# Patient Record
Sex: MALE | Race: WHITE | Employment: OTHER | URBAN - NONMETROPOLITAN AREA
[De-identification: names, ages, dates, MRNs, and addresses within clinical notes are randomized per-mention and may not be internally consistent; named-entity substitution may affect disease eponyms.]

---

## 2017-01-01 ENCOUNTER — APPOINTMENT (OUTPATIENT)
Dept: GENERAL RADIOLOGY | Age: 82
DRG: 570 | End: 2017-01-01
Attending: INTERNAL MEDICINE
Payer: MEDICARE

## 2017-01-01 ENCOUNTER — APPOINTMENT (OUTPATIENT)
Dept: INTERVENTIONAL RADIOLOGY/VASCULAR | Age: 82
DRG: 602 | End: 2017-01-01
Attending: FAMILY MEDICINE
Payer: MEDICARE

## 2017-01-01 ENCOUNTER — HOSPITAL ENCOUNTER (OUTPATIENT)
Age: 82
Setting detail: OUTPATIENT SURGERY
Discharge: HOME | End: 2017-12-11
Attending: SPECIALIST | Admitting: SPECIALIST
Payer: MEDICARE

## 2017-01-01 ENCOUNTER — APPOINTMENT (OUTPATIENT)
Dept: ULTRASOUND IMAGING | Age: 82
DRG: 570 | End: 2017-01-01
Attending: INTERNAL MEDICINE
Payer: MEDICARE

## 2017-01-01 ENCOUNTER — HOSPITAL ENCOUNTER (INPATIENT)
Age: 82
LOS: 3 days | Discharge: NURSING, REHAB OR LONG-TERM CARE | DRG: 570 | End: 2017-12-22
Attending: INTERNAL MEDICINE | Admitting: INTERNAL MEDICINE
Payer: MEDICARE

## 2017-01-01 ENCOUNTER — HOSPITAL ENCOUNTER (OUTPATIENT)
Age: 82
Discharge: HOME | End: 2017-09-25
Payer: MEDICARE

## 2017-01-01 ENCOUNTER — HOSPITAL ENCOUNTER (INPATIENT)
Age: 82
LOS: 14 days | Discharge: HOME HEALTH | DRG: 602 | End: 2018-01-05
Attending: FAMILY MEDICINE | Admitting: FAMILY MEDICINE
Payer: MEDICARE

## 2017-01-01 ENCOUNTER — HOSPITAL ENCOUNTER (OUTPATIENT)
Age: 82
Setting detail: OUTPATIENT SURGERY
Discharge: HOME | End: 2017-08-16
Attending: SPECIALIST | Admitting: SPECIALIST
Payer: MEDICARE

## 2017-01-01 ENCOUNTER — APPOINTMENT (OUTPATIENT)
Dept: CT IMAGING | Age: 82
DRG: 570 | End: 2017-01-01
Attending: INTERNAL MEDICINE
Payer: MEDICARE

## 2017-01-01 VITALS
HEIGHT: 68 IN | HEART RATE: 76 BPM | TEMPERATURE: 97.7 F | DIASTOLIC BLOOD PRESSURE: 73 MMHG | WEIGHT: 148.2 LBS | OXYGEN SATURATION: 97 % | RESPIRATION RATE: 16 BRPM | SYSTOLIC BLOOD PRESSURE: 138 MMHG | BODY MASS INDEX: 22.46 KG/M2

## 2017-01-01 VITALS
BODY MASS INDEX: 23.39 KG/M2 | SYSTOLIC BLOOD PRESSURE: 110 MMHG | DIASTOLIC BLOOD PRESSURE: 60 MMHG | OXYGEN SATURATION: 93 % | TEMPERATURE: 97.6 F | RESPIRATION RATE: 17 BRPM | HEIGHT: 68 IN | WEIGHT: 154.3 LBS | HEART RATE: 71 BPM

## 2017-01-01 VITALS
TEMPERATURE: 98.7 F | WEIGHT: 147 LBS | HEIGHT: 68 IN | DIASTOLIC BLOOD PRESSURE: 79 MMHG | BODY MASS INDEX: 22.28 KG/M2 | HEART RATE: 64 BPM | SYSTOLIC BLOOD PRESSURE: 158 MMHG | RESPIRATION RATE: 16 BRPM | OXYGEN SATURATION: 95 %

## 2017-01-01 DIAGNOSIS — L03.312 CELLULITIS OF BACK EXCEPT BUTTOCK: Primary | ICD-10-CM

## 2017-01-01 DIAGNOSIS — E43 SEVERE MALNUTRITION (HCC): ICD-10-CM

## 2017-01-01 LAB
ABO: NORMAL
AEROBIC CULTURE: ABNORMAL
ALBUMIN SERPL-MCNC: 2.7 G/DL (ref 3.5–5.1)
ALBUMIN SERPL-MCNC: 2.7 G/DL (ref 3.5–5.1)
ALBUMIN SERPL-MCNC: 2.9 G/DL (ref 3.5–5.1)
ALBUMIN SERPL-MCNC: 3.1 G/DL (ref 3.5–5.1)
ALBUMIN SERPL-MCNC: 3.6 G/DL (ref 3.5–5.1)
ALP BLD-CCNC: 55 U/L (ref 38–126)
ALP BLD-CCNC: 55 U/L (ref 38–126)
ALP BLD-CCNC: 63 U/L (ref 38–126)
ALP BLD-CCNC: 64 U/L (ref 38–126)
ALP BLD-CCNC: 65 U/L (ref 38–126)
ALT SERPL-CCNC: 13 U/L (ref 11–66)
ALT SERPL-CCNC: 17 U/L (ref 11–66)
ALT SERPL-CCNC: 9 U/L (ref 11–66)
ANAEROBIC CULTURE: ABNORMAL
ANAEROBIC CULTURE: ABNORMAL
ANION GAP SERPL CALCULATED.3IONS-SCNC: 14 MEQ/L (ref 8–16)
ANION GAP SERPL CALCULATED.3IONS-SCNC: 5 MEQ/L (ref 8–16)
ANION GAP SERPL CALCULATED.3IONS-SCNC: 6 MEQ/L (ref 8–16)
ANION GAP SERPL CALCULATED.3IONS-SCNC: 7 MEQ/L (ref 8–16)
ANION GAP SERPL CALCULATED.3IONS-SCNC: 8 MEQ/L (ref 8–16)
ANION GAP SERPL CALCULATED.3IONS-SCNC: 8 MEQ/L (ref 8–16)
ANION GAP SERPL CALCULATED.3IONS-SCNC: 9 MEQ/L (ref 8–16)
ANISOCYTOSIS: ABNORMAL
ANTIBODY SCREEN: NORMAL
AST SERPL-CCNC: 15 U/L (ref 5–40)
AST SERPL-CCNC: 15 U/L (ref 5–40)
AST SERPL-CCNC: 17 U/L (ref 5–40)
AST SERPL-CCNC: 18 U/L (ref 5–40)
AST SERPL-CCNC: 24 U/L (ref 5–40)
BACTERIA: ABNORMAL /HPF
BANDED NEUTROPHILS ABSOLUTE COUNT: 0.1 THOU/MM3
BANDED NEUTROPHILS ABSOLUTE COUNT: 0.1 THOU/MM3
BANDS PRESENT: 2 %
BANDS PRESENT: 3 %
BASOPHILS # BLD: 0 %
BASOPHILS # BLD: 0 %
BASOPHILS # BLD: 2.7 %
BASOPHILS # BLD: 3 %
BASOPHILS ABSOLUTE: 0 THOU/MM3 (ref 0–0.1)
BASOPHILS ABSOLUTE: 0 THOU/MM3 (ref 0–0.1)
BASOPHILS ABSOLUTE: 0.1 THOU/MM3 (ref 0–0.1)
BASOPHILS ABSOLUTE: 0.1 THOU/MM3 (ref 0–0.1)
BILIRUB SERPL-MCNC: 0.2 MG/DL (ref 0.3–1.2)
BILIRUB SERPL-MCNC: 0.3 MG/DL (ref 0.3–1.2)
BILIRUBIN URINE: NEGATIVE
BLOOD CULTURE, ROUTINE: NORMAL
BLOOD CULTURE, ROUTINE: NORMAL
BLOOD, URINE: ABNORMAL
BUN BLDV-MCNC: 35 MG/DL (ref 7–22)
BUN BLDV-MCNC: 37 MG/DL (ref 7–22)
BUN BLDV-MCNC: 38 MG/DL (ref 7–22)
BUN BLDV-MCNC: 41 MG/DL (ref 7–22)
BUN BLDV-MCNC: 42 MG/DL (ref 7–22)
BUN BLDV-MCNC: 47 MG/DL (ref 7–22)
BUN BLDV-MCNC: 55 MG/DL (ref 7–22)
BUN BLDV-MCNC: 60 MG/DL (ref 7–22)
BUN BLDV-MCNC: 63 MG/DL (ref 7–22)
BUN BLDV-MCNC: 63 MG/DL (ref 7–22)
BUN BLDV-MCNC: 71 MG/DL (ref 7–22)
BUN BLDV-MCNC: 74 MG/DL (ref 7–22)
BUN BLDV-MCNC: 79 MG/DL (ref 7–22)
CALCIUM SERPL-MCNC: 10 MG/DL (ref 8.5–10.5)
CALCIUM SERPL-MCNC: 8.3 MG/DL (ref 8.5–10.5)
CALCIUM SERPL-MCNC: 8.6 MG/DL (ref 8.5–10.5)
CALCIUM SERPL-MCNC: 8.6 MG/DL (ref 8.5–10.5)
CALCIUM SERPL-MCNC: 8.8 MG/DL (ref 8.5–10.5)
CALCIUM SERPL-MCNC: 9 MG/DL (ref 8.5–10.5)
CALCIUM SERPL-MCNC: 9 MG/DL (ref 8.5–10.5)
CALCIUM SERPL-MCNC: 9.1 MG/DL (ref 8.5–10.5)
CALCIUM SERPL-MCNC: 9.1 MG/DL (ref 8.5–10.5)
CALCIUM SERPL-MCNC: 9.4 MG/DL (ref 8.5–10.5)
CALCIUM SERPL-MCNC: 9.5 MG/DL (ref 8.5–10.5)
CALCIUM SERPL-MCNC: 9.5 MG/DL (ref 8.5–10.5)
CALCIUM SERPL-MCNC: 9.7 MG/DL (ref 8.5–10.5)
CASTS 2: ABNORMAL /LPF
CASTS UA: ABNORMAL /LPF
CHARACTER, URINE: ABNORMAL
CHLORIDE BLD-SCNC: 88 MEQ/L (ref 98–111)
CHLORIDE BLD-SCNC: 90 MEQ/L (ref 98–111)
CHLORIDE BLD-SCNC: 92 MEQ/L (ref 98–111)
CHLORIDE BLD-SCNC: 93 MEQ/L (ref 98–111)
CHLORIDE BLD-SCNC: 94 MEQ/L (ref 98–111)
CHLORIDE BLD-SCNC: 95 MEQ/L (ref 98–111)
CHLORIDE BLD-SCNC: 96 MEQ/L (ref 98–111)
CHLORIDE BLD-SCNC: 97 MEQ/L (ref 98–111)
CHLORIDE BLD-SCNC: 97 MEQ/L (ref 98–111)
CHLORIDE BLD-SCNC: 99 MEQ/L (ref 98–111)
CHLORIDE BLD-SCNC: 99 MEQ/L (ref 98–111)
CHLORIDE, URINE: 95 MEQ/L
CO2: 32 MEQ/L (ref 23–33)
CO2: 32 MEQ/L (ref 23–33)
CO2: 36 MEQ/L (ref 23–33)
CO2: 37 MEQ/L (ref 23–33)
CO2: 38 MEQ/L (ref 23–33)
CO2: 38 MEQ/L (ref 23–33)
CO2: 40 MEQ/L (ref 23–33)
CO2: 41 MEQ/L (ref 23–33)
CO2: 41 MEQ/L (ref 23–33)
COLOR: YELLOW
CREAT SERPL-MCNC: 1.5 MG/DL (ref 0.4–1.2)
CREAT SERPL-MCNC: 1.6 MG/DL (ref 0.4–1.2)
CREAT SERPL-MCNC: 1.7 MG/DL (ref 0.4–1.2)
CREAT SERPL-MCNC: 1.8 MG/DL (ref 0.4–1.2)
CREAT SERPL-MCNC: 1.9 MG/DL (ref 0.4–1.2)
CREAT SERPL-MCNC: 1.9 MG/DL (ref 0.4–1.2)
CREAT SERPL-MCNC: 2.4 MG/DL (ref 0.4–1.2)
CRYSTALS, UA: ABNORMAL
DIFFERENTIAL, MANUAL: NORMAL
DIFFERENTIAL, MANUAL: NORMAL
EKG ATRIAL RATE: 71 BPM
EKG ATRIAL RATE: 88 BPM
EKG P AXIS: 58 DEGREES
EKG P AXIS: 64 DEGREES
EKG P-R INTERVAL: 164 MS
EKG P-R INTERVAL: 172 MS
EKG Q-T INTERVAL: 354 MS
EKG Q-T INTERVAL: 394 MS
EKG QRS DURATION: 76 MS
EKG QRS DURATION: 90 MS
EKG QTC CALCULATION (BAZETT): 428 MS
EKG QTC CALCULATION (BAZETT): 428 MS
EKG R AXIS: 0 DEGREES
EKG R AXIS: 18 DEGREES
EKG T AXIS: 26 DEGREES
EKG T AXIS: 34 DEGREES
EKG VENTRICULAR RATE: 71 BPM
EKG VENTRICULAR RATE: 88 BPM
EOSINOPHIL # BLD: 10 %
EOSINOPHIL # BLD: 4 %
EOSINOPHIL # BLD: 4.5 %
EOSINOPHIL # BLD: 6 %
EOSINOPHILS ABSOLUTE: 0.2 THOU/MM3 (ref 0–0.4)
EOSINOPHILS ABSOLUTE: 0.5 THOU/MM3 (ref 0–0.4)
EPITHELIAL CELLS, UA: ABNORMAL /HPF
FERRITIN: 473 NG/ML (ref 22–322)
FOLATE: > 20 NG/ML (ref 4.8–24.2)
GFR SERPL CREATININE-BSD FRML MDRD: 26 ML/MIN/1.73M2
GFR SERPL CREATININE-BSD FRML MDRD: 34 ML/MIN/1.73M2
GFR SERPL CREATININE-BSD FRML MDRD: 34 ML/MIN/1.73M2
GFR SERPL CREATININE-BSD FRML MDRD: 36 ML/MIN/1.73M2
GFR SERPL CREATININE-BSD FRML MDRD: 39 ML/MIN/1.73M2
GFR SERPL CREATININE-BSD FRML MDRD: 41 ML/MIN/1.73M2
GFR SERPL CREATININE-BSD FRML MDRD: 45 ML/MIN/1.73M2
GLUCOSE BLD-MCNC: 100 MG/DL (ref 70–108)
GLUCOSE BLD-MCNC: 100 MG/DL (ref 70–108)
GLUCOSE BLD-MCNC: 104 MG/DL (ref 70–108)
GLUCOSE BLD-MCNC: 105 MG/DL (ref 70–108)
GLUCOSE BLD-MCNC: 109 MG/DL (ref 70–108)
GLUCOSE BLD-MCNC: 116 MG/DL (ref 70–108)
GLUCOSE BLD-MCNC: 87 MG/DL (ref 70–108)
GLUCOSE BLD-MCNC: 90 MG/DL (ref 70–108)
GLUCOSE BLD-MCNC: 90 MG/DL (ref 70–108)
GLUCOSE BLD-MCNC: 93 MG/DL (ref 70–108)
GLUCOSE BLD-MCNC: 97 MG/DL (ref 70–108)
GLUCOSE BLD-MCNC: 99 MG/DL (ref 70–108)
GLUCOSE URINE: NEGATIVE MG/DL
GRAM STAIN RESULT: ABNORMAL
GRAM STAIN RESULT: ABNORMAL
HCT VFR BLD CALC: 18.6 % (ref 42–52)
HCT VFR BLD CALC: 19.8 % (ref 42–52)
HCT VFR BLD CALC: 21.3 % (ref 42–52)
HCT VFR BLD CALC: 22.5 % (ref 42–52)
HCT VFR BLD CALC: 22.6 % (ref 42–52)
HCT VFR BLD CALC: 22.7 % (ref 42–52)
HCT VFR BLD CALC: 22.8 % (ref 42–52)
HCT VFR BLD CALC: 23 % (ref 42–52)
HCT VFR BLD CALC: 23.3 % (ref 42–52)
HEMOGLOBIN: 6.2 GM/DL (ref 14–18)
HEMOGLOBIN: 6.6 GM/DL (ref 14–18)
HEMOGLOBIN: 7.3 GM/DL (ref 14–18)
HEMOGLOBIN: 7.4 GM/DL (ref 14–18)
HEMOGLOBIN: 7.4 GM/DL (ref 14–18)
HEMOGLOBIN: 7.5 GM/DL (ref 14–18)
HEMOGLOBIN: 7.6 GM/DL (ref 14–18)
HEMOGLOBIN: 7.6 GM/DL (ref 14–18)
HEMOGLOBIN: 7.7 GM/DL (ref 14–18)
HEMOGLOBIN: 9 GM/DL (ref 14–18)
HYPOCHROMIA: ABNORMAL
IRON SATURATION: 28 % (ref 20–50)
IRON: 47 UG/DL (ref 65–195)
IRON: 54 UG/DL (ref 65–195)
KETONES, URINE: NEGATIVE
LEUKOCYTE ESTERASE, URINE: ABNORMAL
LYMPHOCYTES # BLD: 11 %
LYMPHOCYTES # BLD: 15 %
LYMPHOCYTES # BLD: 15.6 %
LYMPHOCYTES # BLD: 8.7 %
LYMPHOCYTES ABSOLUTE: 0.4 THOU/MM3 (ref 1–4.8)
LYMPHOCYTES ABSOLUTE: 0.5 THOU/MM3 (ref 1–4.8)
LYMPHOCYTES ABSOLUTE: 0.6 THOU/MM3 (ref 1–4.8)
LYMPHOCYTES ABSOLUTE: 0.7 THOU/MM3 (ref 1–4.8)
MACROCYTES: ABNORMAL
MACROCYTES: ABNORMAL
MCH RBC QN AUTO: 31.6 PG (ref 27–31)
MCH RBC QN AUTO: 32.9 PG (ref 27–31)
MCH RBC QN AUTO: 32.9 PG (ref 27–31)
MCH RBC QN AUTO: 33.1 PG (ref 27–31)
MCH RBC QN AUTO: 35.1 PG (ref 27–31)
MCH RBC QN AUTO: 35.6 PG (ref 27–31)
MCHC RBC AUTO-ENTMCNC: 32.4 GM/DL (ref 33–37)
MCHC RBC AUTO-ENTMCNC: 32.6 GM/DL (ref 33–37)
MCHC RBC AUTO-ENTMCNC: 33 GM/DL (ref 33–37)
MCHC RBC AUTO-ENTMCNC: 33.1 GM/DL (ref 33–37)
MCHC RBC AUTO-ENTMCNC: 33.2 GM/DL (ref 33–37)
MCHC RBC AUTO-ENTMCNC: 33.3 GM/DL (ref 33–37)
MCHC RBC AUTO-ENTMCNC: 33.5 GM/DL (ref 33–37)
MCHC RBC AUTO-ENTMCNC: 33.7 GM/DL (ref 33–37)
MCHC RBC AUTO-ENTMCNC: 34.2 GM/DL (ref 33–37)
MCV RBC AUTO: 106.8 FL (ref 80–94)
MCV RBC AUTO: 107.5 FL (ref 80–94)
MCV RBC AUTO: 96.8 FL (ref 80–94)
MCV RBC AUTO: 97.7 FL (ref 80–94)
MCV RBC AUTO: 98.3 FL (ref 80–94)
MCV RBC AUTO: 98.7 FL (ref 80–94)
MCV RBC AUTO: 99.3 FL (ref 80–94)
MCV RBC AUTO: 99.5 FL (ref 80–94)
MCV RBC AUTO: 99.6 FL (ref 80–94)
MISCELLANEOUS 2: ABNORMAL
MONOCYTES # BLD: 10.8 %
MONOCYTES # BLD: 4 %
MONOCYTES # BLD: 7 %
MONOCYTES # BLD: 8.7 %
MONOCYTES ABSOLUTE: 0.2 THOU/MM3 (ref 0.4–1.3)
MONOCYTES ABSOLUTE: 0.3 THOU/MM3 (ref 0.4–1.3)
MONOCYTES ABSOLUTE: 0.3 THOU/MM3 (ref 0.4–1.3)
MONOCYTES ABSOLUTE: 0.4 THOU/MM3 (ref 0.4–1.3)
NITRITE, URINE: NEGATIVE
NUCLEATED RED BLOOD CELLS: 0 /100 WBC
ORGANISM: ABNORMAL
ORGANISM: ABNORMAL
PATHOLOGIST REVIEW: ABNORMAL
PDW BLD-RTO: 21.1 % (ref 11.5–14.5)
PDW BLD-RTO: 22.5 % (ref 11.5–14.5)
PDW BLD-RTO: 25.6 % (ref 11.5–14.5)
PDW BLD-RTO: 25.7 % (ref 11.5–14.5)
PDW BLD-RTO: 26 % (ref 11.5–14.5)
PDW BLD-RTO: 26.3 % (ref 11.5–14.5)
PDW BLD-RTO: 26.6 % (ref 11.5–14.5)
PDW BLD-RTO: 29.1 % (ref 11.5–14.5)
PDW BLD-RTO: 30.1 % (ref 11.5–14.5)
PH UA: 7.5
PLATELET # BLD: 107 THOU/MM3 (ref 130–400)
PLATELET # BLD: 120 THOU/MM3 (ref 130–400)
PLATELET # BLD: 134 THOU/MM3 (ref 130–400)
PLATELET # BLD: 135 THOU/MM3 (ref 130–400)
PLATELET # BLD: 141 THOU/MM3 (ref 130–400)
PLATELET # BLD: 161 THOU/MM3 (ref 130–400)
PLATELET # BLD: 181 THOU/MM3 (ref 130–400)
PLATELET # BLD: 183 THOU/MM3 (ref 130–400)
PLATELET # BLD: 214 THOU/MM3 (ref 130–400)
PLATELET ESTIMATE: ADEQUATE
PLATELET ESTIMATE: ADEQUATE
PMV BLD AUTO: 9 MCM (ref 7.4–10.4)
PMV BLD AUTO: 9.1 MCM (ref 7.4–10.4)
PMV BLD AUTO: 9.2 MCM (ref 7.4–10.4)
PMV BLD AUTO: 9.2 MCM (ref 7.4–10.4)
PMV BLD AUTO: 9.3 MCM (ref 7.4–10.4)
PMV BLD AUTO: 9.3 MCM (ref 7.4–10.4)
PMV BLD AUTO: 9.5 MCM (ref 7.4–10.4)
PMV BLD AUTO: 9.5 MCM (ref 7.4–10.4)
PMV BLD AUTO: 9.9 MCM (ref 7.4–10.4)
POIKILOCYTES: ABNORMAL
POIKILOCYTES: SLIGHT
POTASSIUM SERPL-SCNC: 3.8 MEQ/L (ref 3.5–5.2)
POTASSIUM SERPL-SCNC: 3.8 MEQ/L (ref 3.5–5.2)
POTASSIUM SERPL-SCNC: 3.9 MEQ/L (ref 3.5–5.2)
POTASSIUM SERPL-SCNC: 4.1 MEQ/L (ref 3.5–5.2)
POTASSIUM SERPL-SCNC: 4.3 MEQ/L (ref 3.5–5.2)
POTASSIUM SERPL-SCNC: 4.5 MEQ/L (ref 3.5–5.2)
POTASSIUM SERPL-SCNC: 4.5 MEQ/L (ref 3.5–5.2)
POTASSIUM SERPL-SCNC: 4.6 MEQ/L (ref 3.5–5.2)
POTASSIUM SERPL-SCNC: 4.6 MEQ/L (ref 3.5–5.2)
POTASSIUM SERPL-SCNC: 4.7 MEQ/L (ref 3.5–5.2)
POTASSIUM SERPL-SCNC: 5.3 MEQ/L (ref 3.5–5.2)
PREALBUMIN: 14.8 MG/DL (ref 20–40)
PREALBUMIN: 25.6 MG/DL (ref 20–40)
PROTEIN UA: NEGATIVE
RBC # BLD: 1.75 MILL/MM3 (ref 4.7–6.1)
RBC # BLD: 1.98 MILL/MM3 (ref 4.7–6.1)
RBC # BLD: 2.11 MILL/MM3 (ref 4.7–6.1)
RBC # BLD: 2.2 MILL/MM3 (ref 4.7–6.1)
RBC # BLD: 2.27 MILL/MM3 (ref 4.7–6.1)
RBC # BLD: 2.29 MILL/MM3 (ref 4.7–6.1)
RBC # BLD: 2.31 MILL/MM3 (ref 4.7–6.1)
RBC # BLD: 2.34 MILL/MM3 (ref 4.7–6.1)
RBC # BLD: 2.35 MILL/MM3 (ref 4.7–6.1)
RBC URINE: ABNORMAL /HPF
RENAL EPITHELIAL, UA: ABNORMAL
RETICULOCYTE ABSOLUTE COUNT: 0.8 % (ref 0.5–2)
RH FACTOR: NORMAL
SEG NEUTROPHILS: 67 %
SEG NEUTROPHILS: 69 %
SEG NEUTROPHILS: 73 %
SEG NEUTROPHILS: 75 %
SEGMENTED NEUTROPHILS ABSOLUTE COUNT: 2.5 THOU/MM3 (ref 1.8–7.7)
SEGMENTED NEUTROPHILS ABSOLUTE COUNT: 3 THOU/MM3 (ref 1.8–7.7)
SEGMENTED NEUTROPHILS ABSOLUTE COUNT: 3.2 THOU/MM3 (ref 1.8–7.7)
SEGMENTED NEUTROPHILS ABSOLUTE COUNT: 3.3 THOU/MM3 (ref 1.8–7.7)
SODIUM BLD-SCNC: 134 MEQ/L (ref 135–145)
SODIUM BLD-SCNC: 136 MEQ/L (ref 135–145)
SODIUM BLD-SCNC: 138 MEQ/L (ref 135–145)
SODIUM BLD-SCNC: 139 MEQ/L (ref 135–145)
SODIUM BLD-SCNC: 139 MEQ/L (ref 135–145)
SODIUM BLD-SCNC: 140 MEQ/L (ref 135–145)
SODIUM BLD-SCNC: 140 MEQ/L (ref 135–145)
SODIUM BLD-SCNC: 141 MEQ/L (ref 135–145)
SODIUM BLD-SCNC: 141 MEQ/L (ref 135–145)
SODIUM BLD-SCNC: 143 MEQ/L (ref 135–145)
SODIUM BLD-SCNC: 145 MEQ/L (ref 135–145)
SODIUM URINE: 105 MEQ/L
SPECIFIC GRAVITY, URINE: 1.01 (ref 1–1.03)
TARGET CELLS: ABNORMAL
TARGET CELLS: ABNORMAL
TOTAL IRON BINDING CAPACITY: 170 UG/DL (ref 171–450)
TOTAL PROTEIN: 5.6 G/DL (ref 6.1–8)
TOTAL PROTEIN: 6.1 G/DL (ref 6.1–8)
TOTAL PROTEIN: 6.9 G/DL (ref 6.1–8)
TRANSFERRIN: 180 MG/DL (ref 200–400)
URINE CULTURE REFLEX: NORMAL
UROBILINOGEN, URINE: 0.2 EU/DL
VANCOMYCIN TROUGH: 19.8 UG/ML (ref 5–15)
VANCOMYCIN TROUGH: 9.4 UG/ML (ref 5–15)
VITAMIN B-12: > 2000 PG/ML (ref 211–911)
VITAMIN D 25-HYDROXY: 27 NG/ML (ref 30–100)
WBC # BLD: 3.8 THOU/MM3 (ref 4.8–10.8)
WBC # BLD: 4.1 THOU/MM3 (ref 4.8–10.8)
WBC # BLD: 4.2 THOU/MM3 (ref 4.8–10.8)
WBC # BLD: 4.4 THOU/MM3 (ref 4.8–10.8)
WBC # BLD: 4.5 THOU/MM3 (ref 4.8–10.8)
WBC # BLD: 4.8 THOU/MM3 (ref 4.8–10.8)
WBC # BLD: 5.2 THOU/MM3 (ref 4.8–10.8)
WBC # BLD: 5.6 THOU/MM3 (ref 4.8–10.8)
WBC # BLD: 7.5 THOU/MM3 (ref 4.8–10.8)
WBC UA: ABNORMAL /HPF
YEAST: ABNORMAL

## 2017-01-01 PROCEDURE — 97165 OT EVAL LOW COMPLEX 30 MIN: CPT

## 2017-01-01 PROCEDURE — 2700000000 HC OXYGEN THERAPY PER DAY

## 2017-01-01 PROCEDURE — 36430 TRANSFUSION BLD/BLD COMPNT: CPT

## 2017-01-01 PROCEDURE — 97110 THERAPEUTIC EXERCISES: CPT

## 2017-01-01 PROCEDURE — 2580000003 HC RX 258: Performed by: FAMILY MEDICINE

## 2017-01-01 PROCEDURE — A6446 CONFORM BAND S W>=3" <5"/YD: HCPCS | Performed by: SPECIALIST

## 2017-01-01 PROCEDURE — 87147 CULTURE TYPE IMMUNOLOGIC: CPT

## 2017-01-01 PROCEDURE — 80048 BASIC METABOLIC PNL TOTAL CA: CPT

## 2017-01-01 PROCEDURE — 88305 TISSUE EXAM BY PATHOLOGIST: CPT

## 2017-01-01 PROCEDURE — 97535 SELF CARE MNGMENT TRAINING: CPT

## 2017-01-01 PROCEDURE — 83540 ASSAY OF IRON: CPT

## 2017-01-01 PROCEDURE — 3600000012 HC SURGERY LEVEL 2 ADDTL 15MIN: Performed by: SPECIALIST

## 2017-01-01 PROCEDURE — 97161 PT EVAL LOW COMPLEX 20 MIN: CPT

## 2017-01-01 PROCEDURE — 87186 SC STD MICRODIL/AGAR DIL: CPT

## 2017-01-01 PROCEDURE — 36415 COLL VENOUS BLD VENIPUNCTURE: CPT

## 2017-01-01 PROCEDURE — 87205 SMEAR GRAM STAIN: CPT

## 2017-01-01 PROCEDURE — 85025 COMPLETE CBC W/AUTO DIFF WBC: CPT

## 2017-01-01 PROCEDURE — 76937 US GUIDE VASCULAR ACCESS: CPT

## 2017-01-01 PROCEDURE — 6370000000 HC RX 637 (ALT 250 FOR IP): Performed by: INTERNAL MEDICINE

## 2017-01-01 PROCEDURE — 6370000000 HC RX 637 (ALT 250 FOR IP): Performed by: FAMILY MEDICINE

## 2017-01-01 PROCEDURE — 99232 SBSQ HOSP IP/OBS MODERATE 35: CPT | Performed by: INTERNAL MEDICINE

## 2017-01-01 PROCEDURE — 80202 ASSAY OF VANCOMYCIN: CPT

## 2017-01-01 PROCEDURE — 7100000011 HC PHASE II RECOVERY - ADDTL 15 MIN: Performed by: SPECIALIST

## 2017-01-01 PROCEDURE — 2580000003 HC RX 258: Performed by: INTERNAL MEDICINE

## 2017-01-01 PROCEDURE — 80053 COMPREHEN METABOLIC PANEL: CPT

## 2017-01-01 PROCEDURE — 97530 THERAPEUTIC ACTIVITIES: CPT

## 2017-01-01 PROCEDURE — A6212 FOAM DRG <=16 SQ IN W/BORDER: HCPCS

## 2017-01-01 PROCEDURE — 85045 AUTOMATED RETICULOCYTE COUNT: CPT

## 2017-01-01 PROCEDURE — 1200000000 HC SEMI PRIVATE

## 2017-01-01 PROCEDURE — 6360000002 HC RX W HCPCS: Performed by: FAMILY MEDICINE

## 2017-01-01 PROCEDURE — 05HF33Z INSERTION OF INFUSION DEVICE INTO LEFT CEPHALIC VEIN, PERCUTANEOUS APPROACH: ICD-10-PCS | Performed by: INTERNAL MEDICINE

## 2017-01-01 PROCEDURE — G8988 SELF CARE GOAL STATUS: HCPCS

## 2017-01-01 PROCEDURE — A6252 ABSORPT DRG >16 <=48 W/O BDR: HCPCS

## 2017-01-01 PROCEDURE — 3600000002 HC SURGERY LEVEL 2 BASE: Performed by: SPECIALIST

## 2017-01-01 PROCEDURE — 6360000002 HC RX W HCPCS: Performed by: INTERNAL MEDICINE

## 2017-01-01 PROCEDURE — 1290000000 HC SEMI PRIVATE OTHER R&B

## 2017-01-01 PROCEDURE — 87040 BLOOD CULTURE FOR BACTERIA: CPT

## 2017-01-01 PROCEDURE — A6258 TRANSPARENT FILM >16<=48 IN: HCPCS

## 2017-01-01 PROCEDURE — 51798 US URINE CAPACITY MEASURE: CPT

## 2017-01-01 PROCEDURE — 94640 AIRWAY INHALATION TREATMENT: CPT

## 2017-01-01 PROCEDURE — 71020 XR CHEST STANDARD TWO VW: CPT

## 2017-01-01 PROCEDURE — 7100000010 HC PHASE II RECOVERY - FIRST 15 MIN: Performed by: SPECIALIST

## 2017-01-01 PROCEDURE — 99232 SBSQ HOSP IP/OBS MODERATE 35: CPT | Performed by: NURSE PRACTITIONER

## 2017-01-01 PROCEDURE — A4212 NON CORING NEEDLE OR STYLET: HCPCS

## 2017-01-01 PROCEDURE — P9016 RBC LEUKOCYTES REDUCED: HCPCS

## 2017-01-01 PROCEDURE — 87070 CULTURE OTHR SPECIMN AEROBIC: CPT

## 2017-01-01 PROCEDURE — 0220000000 HC SKILLED NURSING FACILITY

## 2017-01-01 PROCEDURE — 85027 COMPLETE CBC AUTOMATED: CPT

## 2017-01-01 PROCEDURE — 88342 IMHCHEM/IMCYTCHM 1ST ANTB: CPT

## 2017-01-01 PROCEDURE — 2500000003 HC RX 250 WO HCPCS: Performed by: FAMILY MEDICINE

## 2017-01-01 PROCEDURE — 82306 VITAMIN D 25 HYDROXY: CPT

## 2017-01-01 PROCEDURE — 87077 CULTURE AEROBIC IDENTIFY: CPT

## 2017-01-01 PROCEDURE — 76770 US EXAM ABDO BACK WALL COMP: CPT

## 2017-01-01 PROCEDURE — 87075 CULTR BACTERIA EXCEPT BLOOD: CPT

## 2017-01-01 PROCEDURE — 82607 VITAMIN B-12: CPT

## 2017-01-01 PROCEDURE — 85018 HEMOGLOBIN: CPT

## 2017-01-01 PROCEDURE — 86923 COMPATIBILITY TEST ELECTRIC: CPT

## 2017-01-01 PROCEDURE — 6360000002 HC RX W HCPCS: Performed by: PHARMACIST

## 2017-01-01 PROCEDURE — 86900 BLOOD TYPING SEROLOGIC ABO: CPT

## 2017-01-01 PROCEDURE — 82436 ASSAY OF URINE CHLORIDE: CPT

## 2017-01-01 PROCEDURE — 2500000003 HC RX 250 WO HCPCS: Performed by: SPECIALIST

## 2017-01-01 PROCEDURE — 2500000003 HC RX 250 WO HCPCS: Performed by: INTERNAL MEDICINE

## 2017-01-01 PROCEDURE — 86850 RBC ANTIBODY SCREEN: CPT

## 2017-01-01 PROCEDURE — A6446 CONFORM BAND S W>=3" <5"/YD: HCPCS

## 2017-01-01 PROCEDURE — 88341 IMHCHEM/IMCYTCHM EA ADD ANTB: CPT

## 2017-01-01 PROCEDURE — 0JB70ZZ EXCISION OF BACK SUBCUTANEOUS TISSUE AND FASCIA, OPEN APPROACH: ICD-10-PCS | Performed by: INTERNAL MEDICINE

## 2017-01-01 PROCEDURE — 82746 ASSAY OF FOLIC ACID SERUM: CPT

## 2017-01-01 PROCEDURE — 84300 ASSAY OF URINE SODIUM: CPT

## 2017-01-01 PROCEDURE — 93005 ELECTROCARDIOGRAM TRACING: CPT

## 2017-01-01 PROCEDURE — 6370000000 HC RX 637 (ALT 250 FOR IP): Performed by: PHYSICIAN ASSISTANT

## 2017-01-01 PROCEDURE — 72128 CT CHEST SPINE W/O DYE: CPT

## 2017-01-01 PROCEDURE — 86901 BLOOD TYPING SEROLOGIC RH(D): CPT

## 2017-01-01 PROCEDURE — 88331 PATH CONSLTJ SURG 1 BLK 1SPC: CPT

## 2017-01-01 PROCEDURE — C1751 CATH, INF, PER/CENT/MIDLINE: HCPCS

## 2017-01-01 PROCEDURE — 84134 ASSAY OF PREALBUMIN: CPT

## 2017-01-01 PROCEDURE — 87086 URINE CULTURE/COLONY COUNT: CPT

## 2017-01-01 PROCEDURE — 93971 EXTREMITY STUDY: CPT

## 2017-01-01 PROCEDURE — 36569 INSJ PICC 5 YR+ W/O IMAGING: CPT

## 2017-01-01 PROCEDURE — A6452 HIGH COMPRES BAND W>=3"<5"YD: HCPCS | Performed by: SPECIALIST

## 2017-01-01 PROCEDURE — 82948 REAGENT STRIP/BLOOD GLUCOSE: CPT

## 2017-01-01 PROCEDURE — A6223 GAUZE >16<=48 NO W/SAL W/O B: HCPCS | Performed by: SPECIALIST

## 2017-01-01 PROCEDURE — G8989 SELF CARE D/C STATUS: HCPCS

## 2017-01-01 PROCEDURE — 97166 OT EVAL MOD COMPLEX 45 MIN: CPT

## 2017-01-01 PROCEDURE — A6449 LT COMPRES BAND >=3" <5"/YD: HCPCS | Performed by: SPECIALIST

## 2017-01-01 PROCEDURE — 6360000002 HC RX W HCPCS: Performed by: SPECIALIST

## 2017-01-01 PROCEDURE — 82728 ASSAY OF FERRITIN: CPT

## 2017-01-01 PROCEDURE — 72072 X-RAY EXAM THORAC SPINE 3VWS: CPT

## 2017-01-01 PROCEDURE — 99223 1ST HOSP IP/OBS HIGH 75: CPT | Performed by: NURSE PRACTITIONER

## 2017-01-01 PROCEDURE — 84466 ASSAY OF TRANSFERRIN: CPT

## 2017-01-01 PROCEDURE — 83550 IRON BINDING TEST: CPT

## 2017-01-01 PROCEDURE — G8987 SELF CARE CURRENT STATUS: HCPCS

## 2017-01-01 PROCEDURE — 81001 URINALYSIS AUTO W/SCOPE: CPT

## 2017-01-01 RX ORDER — VANCOMYCIN HYDROCHLORIDE 1 G/200ML
1 INJECTION, SOLUTION INTRAVENOUS
Status: DISCONTINUED | OUTPATIENT
Start: 2017-01-01 | End: 2017-01-01 | Stop reason: HOSPADM

## 2017-01-01 RX ORDER — AMOXICILLIN AND CLAVULANATE POTASSIUM 500; 125 MG/1; MG/1
1 TABLET, FILM COATED ORAL 3 TIMES DAILY
Status: ON HOLD | COMMUNITY
End: 2017-01-01 | Stop reason: HOSPADM

## 2017-01-01 RX ORDER — SODIUM CHLORIDE 0.9 % (FLUSH) 0.9 %
10 SYRINGE (ML) INJECTION PRN
Status: DISCONTINUED | OUTPATIENT
Start: 2017-01-01 | End: 2017-01-01 | Stop reason: SDUPTHER

## 2017-01-01 RX ORDER — ASCORBIC ACID 500 MG
250 TABLET ORAL DAILY
Status: DISCONTINUED | OUTPATIENT
Start: 2017-01-01 | End: 2017-01-01 | Stop reason: HOSPADM

## 2017-01-01 RX ORDER — MULTIVIT WITH MINERALS/LUTEIN
250 TABLET ORAL DAILY
COMMUNITY

## 2017-01-01 RX ORDER — ZINC GLUCONATE 50 MG
50 TABLET ORAL DAILY
COMMUNITY

## 2017-01-01 RX ORDER — TRAMADOL HYDROCHLORIDE 50 MG/1
50 TABLET ORAL EVERY 6 HOURS PRN
Status: DISCONTINUED | OUTPATIENT
Start: 2017-01-01 | End: 2018-01-01 | Stop reason: HOSPADM

## 2017-01-01 RX ORDER — ASCORBIC ACID 500 MG
250 TABLET ORAL DAILY
Status: DISCONTINUED | OUTPATIENT
Start: 2017-01-01 | End: 2018-01-01 | Stop reason: HOSPADM

## 2017-01-01 RX ORDER — ASCORBIC ACID 500 MG
250 TABLET ORAL DAILY
Status: CANCELLED | OUTPATIENT
Start: 2017-01-01

## 2017-01-01 RX ORDER — FUROSEMIDE 40 MG/1
40 TABLET ORAL DAILY
Status: CANCELLED | OUTPATIENT
Start: 2017-01-01

## 2017-01-01 RX ORDER — MULTIVITAMIN WITH FOLIC ACID 400 MCG
1 TABLET ORAL DAILY
Status: DISCONTINUED | OUTPATIENT
Start: 2017-01-01 | End: 2017-01-01 | Stop reason: HOSPADM

## 2017-01-01 RX ORDER — 0.9 % SODIUM CHLORIDE 0.9 %
250 INTRAVENOUS SOLUTION INTRAVENOUS ONCE
Status: COMPLETED | OUTPATIENT
Start: 2017-01-01 | End: 2017-01-01

## 2017-01-01 RX ORDER — VANCOMYCIN HYDROCHLORIDE 1 G/200ML
1 INJECTION, SOLUTION INTRAVENOUS EVERY 24 HOURS
Status: DISCONTINUED | OUTPATIENT
Start: 2017-01-01 | End: 2017-01-01

## 2017-01-01 RX ORDER — SODIUM CHLORIDE 0.9 % (FLUSH) 0.9 %
10 SYRINGE (ML) INJECTION PRN
Status: CANCELLED | OUTPATIENT
Start: 2017-01-01

## 2017-01-01 RX ORDER — FOLIC ACID 1 MG/1
1 TABLET ORAL DAILY
Status: DISCONTINUED | OUTPATIENT
Start: 2017-01-01 | End: 2017-01-01 | Stop reason: HOSPADM

## 2017-01-01 RX ORDER — CEFADROXIL 500 MG/1
500 CAPSULE ORAL 2 TIMES DAILY
COMMUNITY
End: 2017-01-01 | Stop reason: ALTCHOICE

## 2017-01-01 RX ORDER — TAMSULOSIN HYDROCHLORIDE 0.4 MG/1
0.8 CAPSULE ORAL DAILY
Status: DISCONTINUED | OUTPATIENT
Start: 2017-01-01 | End: 2018-01-01 | Stop reason: HOSPADM

## 2017-01-01 RX ORDER — TRAMADOL HYDROCHLORIDE 50 MG/1
100 TABLET ORAL EVERY 6 HOURS PRN
Status: DISCONTINUED | OUTPATIENT
Start: 2017-01-01 | End: 2018-01-01 | Stop reason: HOSPADM

## 2017-01-01 RX ORDER — PANTOPRAZOLE SODIUM 40 MG/1
40 TABLET, DELAYED RELEASE ORAL DAILY
Status: DISCONTINUED | OUTPATIENT
Start: 2017-01-01 | End: 2018-01-01 | Stop reason: HOSPADM

## 2017-01-01 RX ORDER — PREDNISONE 1 MG/1
5 TABLET ORAL DAILY
Status: DISCONTINUED | OUTPATIENT
Start: 2017-01-01 | End: 2018-01-01 | Stop reason: HOSPADM

## 2017-01-01 RX ORDER — CYANOCOBALAMIN 1000 UG/ML
1000 INJECTION INTRAMUSCULAR; SUBCUTANEOUS ONCE
Status: COMPLETED | OUTPATIENT
Start: 2017-01-01 | End: 2017-01-01

## 2017-01-01 RX ORDER — SODIUM CHLORIDE 0.9 % (FLUSH) 0.9 %
10 SYRINGE (ML) INJECTION EVERY 12 HOURS SCHEDULED
Status: DISCONTINUED | OUTPATIENT
Start: 2017-01-01 | End: 2017-01-01 | Stop reason: HOSPADM

## 2017-01-01 RX ORDER — SODIUM CHLORIDE 0.9 % (FLUSH) 0.9 %
10 SYRINGE (ML) INJECTION EVERY 12 HOURS SCHEDULED
Status: CANCELLED | OUTPATIENT
Start: 2017-01-01

## 2017-01-01 RX ORDER — CLONIDINE HYDROCHLORIDE 0.1 MG/1
0.1 TABLET ORAL 2 TIMES DAILY
Status: DISCONTINUED | OUTPATIENT
Start: 2017-01-01 | End: 2018-01-01

## 2017-01-01 RX ORDER — SODIUM CHLORIDE 0.9 % (FLUSH) 0.9 %
10 SYRINGE (ML) INJECTION EVERY 12 HOURS SCHEDULED
Status: DISCONTINUED | OUTPATIENT
Start: 2017-01-01 | End: 2017-01-01 | Stop reason: SDUPTHER

## 2017-01-01 RX ORDER — CLONIDINE HYDROCHLORIDE 0.1 MG/1
0.1 TABLET ORAL 2 TIMES DAILY
Status: DISCONTINUED | OUTPATIENT
Start: 2017-01-01 | End: 2017-01-01 | Stop reason: HOSPADM

## 2017-01-01 RX ORDER — VANCOMYCIN HYDROCHLORIDE 1 G/200ML
1 INJECTION, SOLUTION INTRAVENOUS
Status: CANCELLED | OUTPATIENT
Start: 2017-01-01

## 2017-01-01 RX ORDER — VANCOMYCIN HYDROCHLORIDE 1 G/200ML
1 INJECTION, SOLUTION INTRAVENOUS
Status: DISCONTINUED | OUTPATIENT
Start: 2017-01-01 | End: 2017-01-01 | Stop reason: DRUGHIGH

## 2017-01-01 RX ORDER — LIDOCAINE HYDROCHLORIDE AND EPINEPHRINE 10; 10 MG/ML; UG/ML
INJECTION, SOLUTION INFILTRATION; PERINEURAL PRN
Status: DISCONTINUED | OUTPATIENT
Start: 2017-01-01 | End: 2017-01-01 | Stop reason: HOSPADM

## 2017-01-01 RX ORDER — POTASSIUM CHLORIDE 750 MG/1
10 TABLET, FILM COATED, EXTENDED RELEASE ORAL EVERY OTHER DAY
Status: DISCONTINUED | OUTPATIENT
Start: 2017-01-01 | End: 2017-01-01

## 2017-01-01 RX ORDER — FOLIC ACID 1 MG/1
1 TABLET ORAL DAILY
Status: CANCELLED | OUTPATIENT
Start: 2017-01-01

## 2017-01-01 RX ORDER — ALBUTEROL SULFATE 90 UG/1
2 AEROSOL, METERED RESPIRATORY (INHALATION) EVERY 6 HOURS PRN
Status: DISCONTINUED | OUTPATIENT
Start: 2017-01-01 | End: 2017-01-01 | Stop reason: HOSPADM

## 2017-01-01 RX ORDER — DRONABINOL 2.5 MG/1
2.5 CAPSULE ORAL 2 TIMES DAILY
Status: DISCONTINUED | OUTPATIENT
Start: 2017-01-01 | End: 2018-01-01 | Stop reason: HOSPADM

## 2017-01-01 RX ORDER — LIDOCAINE HYDROCHLORIDE AND EPINEPHRINE BITARTRATE 20; .01 MG/ML; MG/ML
20 INJECTION, SOLUTION SUBCUTANEOUS ONCE
Status: COMPLETED | OUTPATIENT
Start: 2017-01-01 | End: 2017-01-01

## 2017-01-01 RX ORDER — VANCOMYCIN HYDROCHLORIDE 1 G/200ML
1000 INJECTION, SOLUTION INTRAVENOUS EVERY 24 HOURS
Status: DISCONTINUED | OUTPATIENT
Start: 2017-01-01 | End: 2017-01-01

## 2017-01-01 RX ORDER — GINSENG 100 MG
CAPSULE ORAL CONTINUOUS PRN
Status: DISCONTINUED | OUTPATIENT
Start: 2017-01-01 | End: 2018-01-01 | Stop reason: HOSPADM

## 2017-01-01 RX ORDER — ACETAMINOPHEN 325 MG/1
650 TABLET ORAL EVERY 4 HOURS PRN
Status: DISCONTINUED | OUTPATIENT
Start: 2017-01-01 | End: 2017-01-01 | Stop reason: HOSPADM

## 2017-01-01 RX ORDER — SODIUM CHLORIDE 0.9 % (FLUSH) 0.9 %
10 SYRINGE (ML) INJECTION PRN
Status: DISCONTINUED | OUTPATIENT
Start: 2017-01-01 | End: 2017-01-01 | Stop reason: HOSPADM

## 2017-01-01 RX ORDER — PANTOPRAZOLE SODIUM 40 MG/1
40 TABLET, DELAYED RELEASE ORAL DAILY
Status: CANCELLED | OUTPATIENT
Start: 2017-01-01

## 2017-01-01 RX ORDER — PANTOPRAZOLE SODIUM 40 MG/1
40 TABLET, DELAYED RELEASE ORAL DAILY
Status: DISCONTINUED | OUTPATIENT
Start: 2017-01-01 | End: 2017-01-01 | Stop reason: HOSPADM

## 2017-01-01 RX ORDER — ALBUTEROL SULFATE 90 UG/1
2 AEROSOL, METERED RESPIRATORY (INHALATION) EVERY 6 HOURS PRN
Status: CANCELLED | OUTPATIENT
Start: 2017-01-01

## 2017-01-01 RX ORDER — MULTIVITAMIN WITH FOLIC ACID 400 MCG
1 TABLET ORAL DAILY
Status: CANCELLED | OUTPATIENT
Start: 2017-01-01

## 2017-01-01 RX ORDER — ONDANSETRON 2 MG/ML
4 INJECTION INTRAMUSCULAR; INTRAVENOUS EVERY 6 HOURS PRN
Status: CANCELLED | OUTPATIENT
Start: 2017-01-01

## 2017-01-01 RX ORDER — ACETAMINOPHEN 325 MG/1
650 TABLET ORAL EVERY 4 HOURS PRN
Status: CANCELLED | OUTPATIENT
Start: 2017-01-01

## 2017-01-01 RX ORDER — PREDNISONE 1 MG/1
5 TABLET ORAL DAILY
Status: CANCELLED | OUTPATIENT
Start: 2017-01-01

## 2017-01-01 RX ORDER — FOLIC ACID 1 MG/1
1 TABLET ORAL DAILY
Status: DISCONTINUED | OUTPATIENT
Start: 2017-01-01 | End: 2018-01-01 | Stop reason: HOSPADM

## 2017-01-01 RX ORDER — TAMSULOSIN HYDROCHLORIDE 0.4 MG/1
0.8 CAPSULE ORAL DAILY
Status: DISCONTINUED | OUTPATIENT
Start: 2017-01-01 | End: 2017-01-01 | Stop reason: HOSPADM

## 2017-01-01 RX ORDER — ONDANSETRON 2 MG/ML
4 INJECTION INTRAMUSCULAR; INTRAVENOUS EVERY 6 HOURS PRN
Status: DISCONTINUED | OUTPATIENT
Start: 2017-01-01 | End: 2018-01-01 | Stop reason: HOSPADM

## 2017-01-01 RX ORDER — CLONIDINE HYDROCHLORIDE 0.2 MG/1
0.2 TABLET ORAL 2 TIMES DAILY
Status: DISCONTINUED | OUTPATIENT
Start: 2017-01-01 | End: 2017-01-01

## 2017-01-01 RX ORDER — LIDOCAINE HYDROCHLORIDE 10 MG/ML
5 INJECTION, SOLUTION EPIDURAL; INFILTRATION; INTRACAUDAL; PERINEURAL ONCE
Status: DISCONTINUED | OUTPATIENT
Start: 2017-01-01 | End: 2017-01-01 | Stop reason: HOSPADM

## 2017-01-01 RX ORDER — ALBUTEROL SULFATE 90 UG/1
2 AEROSOL, METERED RESPIRATORY (INHALATION) EVERY 6 HOURS PRN
Status: DISCONTINUED | OUTPATIENT
Start: 2017-01-01 | End: 2018-01-01

## 2017-01-01 RX ORDER — ONDANSETRON 2 MG/ML
4 INJECTION INTRAMUSCULAR; INTRAVENOUS EVERY 6 HOURS PRN
Status: DISCONTINUED | OUTPATIENT
Start: 2017-01-01 | End: 2017-01-01 | Stop reason: HOSPADM

## 2017-01-01 RX ORDER — SODIUM CHLORIDE 0.9 % (FLUSH) 0.9 %
10 SYRINGE (ML) INJECTION EVERY 12 HOURS SCHEDULED
Status: DISCONTINUED | OUTPATIENT
Start: 2017-01-01 | End: 2018-01-01 | Stop reason: HOSPADM

## 2017-01-01 RX ORDER — GINSENG 100 MG
CAPSULE ORAL CONTINUOUS PRN
Status: DISCONTINUED | OUTPATIENT
Start: 2017-01-01 | End: 2017-01-01 | Stop reason: HOSPADM

## 2017-01-01 RX ORDER — SODIUM CHLORIDE 0.9 % (FLUSH) 0.9 %
10 SYRINGE (ML) INJECTION PRN
Status: DISCONTINUED | OUTPATIENT
Start: 2017-01-01 | End: 2018-01-01 | Stop reason: HOSPADM

## 2017-01-01 RX ORDER — SODIUM CHLORIDE 9 MG/ML
INJECTION, SOLUTION INTRAVENOUS CONTINUOUS
Status: DISCONTINUED | OUTPATIENT
Start: 2017-01-01 | End: 2017-01-01

## 2017-01-01 RX ORDER — VANCOMYCIN HYDROCHLORIDE 1 G/200ML
1000 INJECTION, SOLUTION INTRAVENOUS
Status: DISCONTINUED | OUTPATIENT
Start: 2017-01-01 | End: 2018-01-01 | Stop reason: HOSPADM

## 2017-01-01 RX ORDER — TAMSULOSIN HYDROCHLORIDE 0.4 MG/1
0.8 CAPSULE ORAL DAILY
Status: CANCELLED | OUTPATIENT
Start: 2017-01-01

## 2017-01-01 RX ORDER — ACETAMINOPHEN 325 MG/1
650 TABLET ORAL EVERY 4 HOURS PRN
Status: DISCONTINUED | OUTPATIENT
Start: 2017-01-01 | End: 2018-01-01 | Stop reason: HOSPADM

## 2017-01-01 RX ORDER — PREDNISONE 1 MG/1
5 TABLET ORAL DAILY
Status: DISCONTINUED | OUTPATIENT
Start: 2017-01-01 | End: 2017-01-01 | Stop reason: HOSPADM

## 2017-01-01 RX ORDER — GINSENG 100 MG
CAPSULE ORAL CONTINUOUS PRN
Status: CANCELLED | OUTPATIENT
Start: 2017-01-01

## 2017-01-01 RX ORDER — MULTIVITAMIN WITH FOLIC ACID 400 MCG
1 TABLET ORAL DAILY
Status: DISCONTINUED | OUTPATIENT
Start: 2017-01-01 | End: 2018-01-01 | Stop reason: HOSPADM

## 2017-01-01 RX ORDER — LOPERAMIDE HYDROCHLORIDE 2 MG/1
2 CAPSULE ORAL 4 TIMES DAILY PRN
Status: DISCONTINUED | OUTPATIENT
Start: 2017-01-01 | End: 2018-01-01 | Stop reason: HOSPADM

## 2017-01-01 RX ORDER — ZINC GLUCONATE 50 MG
50 TABLET ORAL DAILY
Status: DISCONTINUED | OUTPATIENT
Start: 2017-01-01 | End: 2017-01-01 | Stop reason: RX

## 2017-01-01 RX ORDER — CLONIDINE HYDROCHLORIDE 0.1 MG/1
0.1 TABLET ORAL 2 TIMES DAILY
Status: CANCELLED | OUTPATIENT
Start: 2017-01-01

## 2017-01-01 RX ORDER — FUROSEMIDE 40 MG/1
40 TABLET ORAL DAILY
Status: DISCONTINUED | OUTPATIENT
Start: 2017-01-01 | End: 2018-01-01 | Stop reason: HOSPADM

## 2017-01-01 RX ORDER — FUROSEMIDE 40 MG/1
40 TABLET ORAL DAILY
Status: DISCONTINUED | OUTPATIENT
Start: 2017-01-01 | End: 2017-01-01 | Stop reason: HOSPADM

## 2017-01-01 RX ADMIN — ACETAMINOPHEN 650 MG: 325 TABLET ORAL at 00:59

## 2017-01-01 RX ADMIN — FOLIC ACID 1 MG: 1 TABLET ORAL at 09:06

## 2017-01-01 RX ADMIN — PREDNISONE 5 MG: 5 TABLET ORAL at 09:46

## 2017-01-01 RX ADMIN — FUROSEMIDE 40 MG: 40 TABLET ORAL at 09:38

## 2017-01-01 RX ADMIN — PREDNISONE 5 MG: 5 TABLET ORAL at 08:35

## 2017-01-01 RX ADMIN — THERA TABS 1 TABLET: TAB at 09:58

## 2017-01-01 RX ADMIN — METOPROLOL TARTRATE 25 MG: 25 TABLET ORAL at 23:06

## 2017-01-01 RX ADMIN — TAMSULOSIN HYDROCHLORIDE 0.8 MG: 0.4 CAPSULE ORAL at 08:45

## 2017-01-01 RX ADMIN — VITAMIN D, TAB 1000IU (100/BT) 5000 UNITS: 25 TAB at 12:00

## 2017-01-01 RX ADMIN — CLONIDINE HYDROCHLORIDE 0.1 MG: 0.1 TABLET ORAL at 10:55

## 2017-01-01 RX ADMIN — THERA TABS 1 TABLET: TAB at 09:30

## 2017-01-01 RX ADMIN — VANCOMYCIN HYDROCHLORIDE 1 G: 1 INJECTION, SOLUTION INTRAVENOUS at 17:43

## 2017-01-01 RX ADMIN — VITAMIN D, TAB 1000IU (100/BT) 5000 UNITS: 25 TAB at 08:45

## 2017-01-01 RX ADMIN — HYOSCYAMINE SULFATE: 16 SOLUTION at 10:51

## 2017-01-01 RX ADMIN — HYOSCYAMINE SULFATE: 16 SOLUTION at 09:30

## 2017-01-01 RX ADMIN — METOPROLOL TARTRATE 25 MG: 25 TABLET ORAL at 10:27

## 2017-01-01 RX ADMIN — Medication 10000 UNITS: at 09:38

## 2017-01-01 RX ADMIN — CLONIDINE HYDROCHLORIDE 0.1 MG: 0.1 TABLET ORAL at 10:28

## 2017-01-01 RX ADMIN — Medication 10 ML: at 20:40

## 2017-01-01 RX ADMIN — POTASSIUM CHLORIDE 10 MEQ: 750 TABLET, FILM COATED, EXTENDED RELEASE ORAL at 08:35

## 2017-01-01 RX ADMIN — PANTOPRAZOLE SODIUM 40 MG: 40 TABLET, DELAYED RELEASE ORAL at 08:48

## 2017-01-01 RX ADMIN — THERA TABS 1 TABLET: TAB at 09:38

## 2017-01-01 RX ADMIN — FOLIC ACID 1 MG: 1 TABLET ORAL at 08:46

## 2017-01-01 RX ADMIN — METOPROLOL TARTRATE 25 MG: 25 TABLET ORAL at 21:46

## 2017-01-01 RX ADMIN — PREDNISONE 5 MG: 5 TABLET ORAL at 09:30

## 2017-01-01 RX ADMIN — FUROSEMIDE 40 MG: 40 TABLET ORAL at 08:33

## 2017-01-01 RX ADMIN — Medication 10 ML: at 16:10

## 2017-01-01 RX ADMIN — Medication 10000 UNITS: at 08:46

## 2017-01-01 RX ADMIN — Medication 2 PUFF: at 19:32

## 2017-01-01 RX ADMIN — TAMSULOSIN HYDROCHLORIDE 0.8 MG: 0.4 CAPSULE ORAL at 20:26

## 2017-01-01 RX ADMIN — PREDNISONE 5 MG: 5 TABLET ORAL at 09:38

## 2017-01-01 RX ADMIN — FOLIC ACID 1 MG: 1 TABLET ORAL at 08:47

## 2017-01-01 RX ADMIN — VITAMIN D, TAB 1000IU (100/BT) 5000 UNITS: 25 TAB at 08:00

## 2017-01-01 RX ADMIN — Medication 2 PUFF: at 07:19

## 2017-01-01 RX ADMIN — THERA TABS 1 TABLET: TAB at 08:33

## 2017-01-01 RX ADMIN — VANCOMYCIN HYDROCHLORIDE 1 G: 1 INJECTION, SOLUTION INTRAVENOUS at 05:55

## 2017-01-01 RX ADMIN — PANTOPRAZOLE SODIUM 40 MG: 40 TABLET, DELAYED RELEASE ORAL at 08:33

## 2017-01-01 RX ADMIN — ACETAMINOPHEN 650 MG: 325 TABLET ORAL at 21:07

## 2017-01-01 RX ADMIN — CLONIDINE HYDROCHLORIDE 0.2 MG: 0.2 TABLET ORAL at 08:33

## 2017-01-01 RX ADMIN — SODIUM CHLORIDE: 9 INJECTION, SOLUTION INTRAVENOUS at 15:01

## 2017-01-01 RX ADMIN — METOPROLOL TARTRATE 25 MG: 25 TABLET ORAL at 09:38

## 2017-01-01 RX ADMIN — VANCOMYCIN HYDROCHLORIDE 1 G: 1 INJECTION, SOLUTION INTRAVENOUS at 23:07

## 2017-01-01 RX ADMIN — Medication 2 PUFF: at 06:39

## 2017-01-01 RX ADMIN — CLONIDINE HYDROCHLORIDE 0.1 MG: 0.1 TABLET ORAL at 08:46

## 2017-01-01 RX ADMIN — TRAMADOL HYDROCHLORIDE 100 MG: 50 TABLET, FILM COATED ORAL at 01:27

## 2017-01-01 RX ADMIN — TRAMADOL HYDROCHLORIDE 100 MG: 50 TABLET, FILM COATED ORAL at 20:22

## 2017-01-01 RX ADMIN — PANTOPRAZOLE SODIUM 40 MG: 40 TABLET, DELAYED RELEASE ORAL at 08:01

## 2017-01-01 RX ADMIN — WATER 2.2 ML: 1 INJECTION INTRAMUSCULAR; INTRAVENOUS; SUBCUTANEOUS at 07:43

## 2017-01-01 RX ADMIN — FOLIC ACID 1 MG: 1 TABLET ORAL at 08:01

## 2017-01-01 RX ADMIN — THERA TABS 1 TABLET: TAB at 08:01

## 2017-01-01 RX ADMIN — Medication 10 ML: at 07:00

## 2017-01-01 RX ADMIN — Medication 250 MG: at 09:58

## 2017-01-01 RX ADMIN — CLONIDINE HYDROCHLORIDE 0.1 MG: 0.1 TABLET ORAL at 12:25

## 2017-01-01 RX ADMIN — Medication 2 PUFF: at 19:55

## 2017-01-01 RX ADMIN — PANTOPRAZOLE SODIUM 40 MG: 40 TABLET, DELAYED RELEASE ORAL at 08:35

## 2017-01-01 RX ADMIN — Medication 2 PUFF: at 22:28

## 2017-01-01 RX ADMIN — PANTOPRAZOLE SODIUM 40 MG: 40 TABLET, DELAYED RELEASE ORAL at 09:58

## 2017-01-01 RX ADMIN — PANTOPRAZOLE SODIUM 40 MG: 40 TABLET, DELAYED RELEASE ORAL at 09:44

## 2017-01-01 RX ADMIN — CLONIDINE HYDROCHLORIDE 0.1 MG: 0.1 TABLET ORAL at 21:39

## 2017-01-01 RX ADMIN — METOPROLOL TARTRATE 25 MG: 25 TABLET ORAL at 20:37

## 2017-01-01 RX ADMIN — HYOSCYAMINE SULFATE: 16 SOLUTION at 09:39

## 2017-01-01 RX ADMIN — CLONIDINE HYDROCHLORIDE 0.1 MG: 0.1 TABLET ORAL at 20:37

## 2017-01-01 RX ADMIN — FUROSEMIDE 40 MG: 40 TABLET ORAL at 09:59

## 2017-01-01 RX ADMIN — PANTOPRAZOLE SODIUM 40 MG: 40 TABLET, DELAYED RELEASE ORAL at 08:46

## 2017-01-01 RX ADMIN — Medication 5 UNITS: at 12:16

## 2017-01-01 RX ADMIN — Medication 2 PUFF: at 10:19

## 2017-01-01 RX ADMIN — Medication 2 PUFF: at 08:29

## 2017-01-01 RX ADMIN — BACITRACIN: 500 OINTMENT TOPICAL at 16:59

## 2017-01-01 RX ADMIN — METOPROLOL TARTRATE 25 MG: 25 TABLET ORAL at 21:02

## 2017-01-01 RX ADMIN — METOPROLOL TARTRATE 25 MG: 25 TABLET ORAL at 09:58

## 2017-01-01 RX ADMIN — PREDNISONE 5 MG: 5 TABLET ORAL at 08:46

## 2017-01-01 RX ADMIN — LIDOCAINE HYDROCHLORIDE,EPINEPHRINE BITARTRATE 20 ML: 20; .01 INJECTION, SOLUTION INFILTRATION; PERINEURAL at 17:17

## 2017-01-01 RX ADMIN — FUROSEMIDE 40 MG: 40 TABLET ORAL at 08:01

## 2017-01-01 RX ADMIN — PREDNISONE 5 MG: 5 TABLET ORAL at 10:27

## 2017-01-01 RX ADMIN — CLONIDINE HYDROCHLORIDE 0.1 MG: 0.1 TABLET ORAL at 22:42

## 2017-01-01 RX ADMIN — CLONIDINE HYDROCHLORIDE 0.2 MG: 0.2 TABLET ORAL at 21:46

## 2017-01-01 RX ADMIN — Medication 10000 UNITS: at 09:58

## 2017-01-01 RX ADMIN — FOLIC ACID 1 MG: 1 TABLET ORAL at 09:38

## 2017-01-01 RX ADMIN — TRAMADOL HYDROCHLORIDE 50 MG: 50 TABLET, FILM COATED ORAL at 05:05

## 2017-01-01 RX ADMIN — Medication 250 MG: at 09:42

## 2017-01-01 RX ADMIN — METOPROLOL TARTRATE 25 MG: 25 TABLET ORAL at 22:42

## 2017-01-01 RX ADMIN — Medication 250 MG: at 10:54

## 2017-01-01 RX ADMIN — ACETAMINOPHEN 650 MG: 325 TABLET ORAL at 20:37

## 2017-01-01 RX ADMIN — TRAMADOL HYDROCHLORIDE 50 MG: 50 TABLET, FILM COATED ORAL at 11:36

## 2017-01-01 RX ADMIN — DARBEPOETIN ALFA 100 MCG: 100 INJECTION, SOLUTION INTRAVENOUS; SUBCUTANEOUS at 14:14

## 2017-01-01 RX ADMIN — FOLIC ACID 1 MG: 1 TABLET ORAL at 18:48

## 2017-01-01 RX ADMIN — METOPROLOL TARTRATE 25 MG: 25 TABLET ORAL at 09:06

## 2017-01-01 RX ADMIN — TRAMADOL HYDROCHLORIDE 100 MG: 50 TABLET, FILM COATED ORAL at 11:37

## 2017-01-01 RX ADMIN — METOPROLOL TARTRATE 25 MG: 25 TABLET ORAL at 20:25

## 2017-01-01 RX ADMIN — PANTOPRAZOLE SODIUM 40 MG: 40 TABLET, DELAYED RELEASE ORAL at 09:30

## 2017-01-01 RX ADMIN — FUROSEMIDE 40 MG: 40 TABLET ORAL at 08:47

## 2017-01-01 RX ADMIN — VITAMIN D, TAB 1000IU (100/BT) 5000 UNITS: 25 TAB at 09:29

## 2017-01-01 RX ADMIN — CLONIDINE HYDROCHLORIDE 0.2 MG: 0.2 TABLET ORAL at 20:26

## 2017-01-01 RX ADMIN — Medication 5 UNITS: at 13:29

## 2017-01-01 RX ADMIN — Medication 2 PUFF: at 20:00

## 2017-01-01 RX ADMIN — Medication 2 PUFF: at 20:49

## 2017-01-01 RX ADMIN — Medication 10000 UNITS: at 09:06

## 2017-01-01 RX ADMIN — FOLIC ACID 1 MG: 1 TABLET ORAL at 09:45

## 2017-01-01 RX ADMIN — TRAMADOL HYDROCHLORIDE 100 MG: 50 TABLET, FILM COATED ORAL at 05:36

## 2017-01-01 RX ADMIN — METOPROLOL TARTRATE 25 MG: 25 TABLET ORAL at 08:48

## 2017-01-01 RX ADMIN — Medication 250 MG: at 09:38

## 2017-01-01 RX ADMIN — PREDNISONE 5 MG: 5 TABLET ORAL at 09:06

## 2017-01-01 RX ADMIN — METOPROLOL TARTRATE 25 MG: 25 TABLET ORAL at 21:05

## 2017-01-01 RX ADMIN — ENOXAPARIN SODIUM 40 MG: 40 INJECTION SUBCUTANEOUS at 10:00

## 2017-01-01 RX ADMIN — METOPROLOL TARTRATE 25 MG: 25 TABLET ORAL at 20:21

## 2017-01-01 RX ADMIN — TAMSULOSIN HYDROCHLORIDE 0.8 MG: 0.4 CAPSULE ORAL at 08:35

## 2017-01-01 RX ADMIN — THERA TABS 1 TABLET: TAB at 08:46

## 2017-01-01 RX ADMIN — PREDNISONE 5 MG: 5 TABLET ORAL at 08:03

## 2017-01-01 RX ADMIN — FOLIC ACID 1 MG: 1 TABLET ORAL at 09:59

## 2017-01-01 RX ADMIN — FUROSEMIDE 40 MG: 40 TABLET ORAL at 09:06

## 2017-01-01 RX ADMIN — TRAMADOL HYDROCHLORIDE 50 MG: 50 TABLET, FILM COATED ORAL at 19:28

## 2017-01-01 RX ADMIN — CLONIDINE HYDROCHLORIDE 0.1 MG: 0.1 TABLET ORAL at 21:05

## 2017-01-01 RX ADMIN — Medication 10 ML: at 08:36

## 2017-01-01 RX ADMIN — FOLIC ACID 1 MG: 1 TABLET ORAL at 10:28

## 2017-01-01 RX ADMIN — CLONIDINE HYDROCHLORIDE 0.2 MG: 0.2 TABLET ORAL at 20:25

## 2017-01-01 RX ADMIN — FOLIC ACID 1 MG: 1 TABLET ORAL at 10:55

## 2017-01-01 RX ADMIN — VANCOMYCIN HYDROCHLORIDE 1 G: 1 INJECTION, SOLUTION INTRAVENOUS at 05:34

## 2017-01-01 RX ADMIN — CLONIDINE HYDROCHLORIDE 0.2 MG: 0.2 TABLET ORAL at 09:38

## 2017-01-01 RX ADMIN — Medication 2 PUFF: at 07:46

## 2017-01-01 RX ADMIN — TAMSULOSIN HYDROCHLORIDE 0.8 MG: 0.4 CAPSULE ORAL at 08:00

## 2017-01-01 RX ADMIN — ACETAMINOPHEN 650 MG: 325 TABLET ORAL at 16:10

## 2017-01-01 RX ADMIN — ENOXAPARIN SODIUM 40 MG: 40 INJECTION SUBCUTANEOUS at 08:47

## 2017-01-01 RX ADMIN — Medication 2 PUFF: at 06:25

## 2017-01-01 RX ADMIN — Medication 2 PUFF: at 07:28

## 2017-01-01 RX ADMIN — HYDROMORPHONE HYDROCHLORIDE 1 MG: 1 INJECTION, SOLUTION INTRAMUSCULAR; INTRAVENOUS; SUBCUTANEOUS at 16:59

## 2017-01-01 RX ADMIN — ENOXAPARIN SODIUM 40 MG: 40 INJECTION SUBCUTANEOUS at 09:20

## 2017-01-01 RX ADMIN — Medication 2 PUFF: at 21:10

## 2017-01-01 RX ADMIN — Medication 2 PUFF: at 21:37

## 2017-01-01 RX ADMIN — HYOSCYAMINE SULFATE: 16 SOLUTION at 12:52

## 2017-01-01 RX ADMIN — FOLIC ACID 1 MG: 1 TABLET ORAL at 09:31

## 2017-01-01 RX ADMIN — FUROSEMIDE 40 MG: 40 TABLET ORAL at 10:28

## 2017-01-01 RX ADMIN — ONDANSETRON 4 MG: 2 INJECTION INTRAMUSCULAR; INTRAVENOUS at 21:22

## 2017-01-01 RX ADMIN — FOLIC ACID 1 MG: 1 TABLET ORAL at 08:33

## 2017-01-01 RX ADMIN — POTASSIUM CHLORIDE 10 MEQ: 750 TABLET, FILM COATED, EXTENDED RELEASE ORAL at 08:33

## 2017-01-01 RX ADMIN — DRONABINOL 2.5 MG: 2.5 CAPSULE ORAL at 22:27

## 2017-01-01 RX ADMIN — Medication 250 MG: at 09:30

## 2017-01-01 RX ADMIN — Medication 10 ML: at 09:30

## 2017-01-01 RX ADMIN — CLONIDINE HYDROCHLORIDE 0.1 MG: 0.1 TABLET ORAL at 09:59

## 2017-01-01 RX ADMIN — Medication 2 PUFF: at 21:54

## 2017-01-01 RX ADMIN — METOPROLOL TARTRATE 25 MG: 25 TABLET ORAL at 10:54

## 2017-01-01 RX ADMIN — VANCOMYCIN HYDROCHLORIDE 1000 MG: 1 INJECTION, SOLUTION INTRAVENOUS at 17:49

## 2017-01-01 RX ADMIN — THERA TABS 1 TABLET: TAB at 08:48

## 2017-01-01 RX ADMIN — ENOXAPARIN SODIUM 40 MG: 40 INJECTION SUBCUTANEOUS at 14:10

## 2017-01-01 RX ADMIN — PREDNISONE 5 MG: 5 TABLET ORAL at 08:48

## 2017-01-01 RX ADMIN — SODIUM HYPOCHLORITE 473 ML: 2.5 SOLUTION TOPICAL at 18:18

## 2017-01-01 RX ADMIN — HYOSCYAMINE SULFATE: 16 SOLUTION at 07:20

## 2017-01-01 RX ADMIN — VITAMIN D, TAB 1000IU (100/BT) 5000 UNITS: 25 TAB at 10:54

## 2017-01-01 RX ADMIN — Medication 2 PUFF: at 19:17

## 2017-01-01 RX ADMIN — Medication 250 MG: at 08:35

## 2017-01-01 RX ADMIN — CLONIDINE HYDROCHLORIDE 0.1 MG: 0.1 TABLET ORAL at 21:02

## 2017-01-01 RX ADMIN — Medication 250 MG: at 08:48

## 2017-01-01 RX ADMIN — CLONIDINE HYDROCHLORIDE 0.1 MG: 0.1 TABLET ORAL at 09:31

## 2017-01-01 RX ADMIN — Medication 10 ML: at 10:52

## 2017-01-01 RX ADMIN — Medication 10000 UNITS: at 08:00

## 2017-01-01 RX ADMIN — METOPROLOL TARTRATE 25 MG: 25 TABLET ORAL at 09:31

## 2017-01-01 RX ADMIN — Medication 2 PUFF: at 22:08

## 2017-01-01 RX ADMIN — CYANOCOBALAMIN 1000 MCG: 1000 INJECTION, SOLUTION INTRAMUSCULAR at 18:48

## 2017-01-01 RX ADMIN — PANTOPRAZOLE SODIUM 40 MG: 40 TABLET, DELAYED RELEASE ORAL at 09:38

## 2017-01-01 RX ADMIN — Medication 10 ML: at 20:46

## 2017-01-01 RX ADMIN — TAMSULOSIN HYDROCHLORIDE 0.8 MG: 0.4 CAPSULE ORAL at 08:48

## 2017-01-01 RX ADMIN — ENOXAPARIN SODIUM 40 MG: 40 INJECTION SUBCUTANEOUS at 09:32

## 2017-01-01 RX ADMIN — Medication 2 PUFF: at 07:56

## 2017-01-01 RX ADMIN — Medication 2 PUFF: at 10:38

## 2017-01-01 RX ADMIN — METOPROLOL TARTRATE 25 MG: 25 TABLET ORAL at 08:33

## 2017-01-01 RX ADMIN — CLONIDINE HYDROCHLORIDE 0.1 MG: 0.1 TABLET ORAL at 20:21

## 2017-01-01 RX ADMIN — Medication 10 ML: at 05:51

## 2017-01-01 RX ADMIN — THERA TABS 1 TABLET: TAB at 10:28

## 2017-01-01 RX ADMIN — CLONIDINE HYDROCHLORIDE 0.1 MG: 0.1 TABLET ORAL at 20:46

## 2017-01-01 RX ADMIN — VANCOMYCIN HYDROCHLORIDE 1 G: 1 INJECTION, SOLUTION INTRAVENOUS at 17:49

## 2017-01-01 RX ADMIN — METOPROLOL TARTRATE 25 MG: 25 TABLET ORAL at 20:26

## 2017-01-01 RX ADMIN — Medication 10000 UNITS: at 08:48

## 2017-01-01 RX ADMIN — METOPROLOL TARTRATE 25 MG: 25 TABLET ORAL at 09:44

## 2017-01-01 RX ADMIN — Medication 10 ML: at 09:09

## 2017-01-01 RX ADMIN — SODIUM CHLORIDE: 9 INJECTION, SOLUTION INTRAVENOUS at 21:43

## 2017-01-01 RX ADMIN — Medication 10000 UNITS: at 08:33

## 2017-01-01 RX ADMIN — FUROSEMIDE 40 MG: 40 TABLET ORAL at 09:31

## 2017-01-01 RX ADMIN — Medication 10 ML: at 20:21

## 2017-01-01 RX ADMIN — TAMSULOSIN HYDROCHLORIDE 0.8 MG: 0.4 CAPSULE ORAL at 12:25

## 2017-01-01 RX ADMIN — Medication 10 ML: at 12:53

## 2017-01-01 RX ADMIN — METOPROLOL TARTRATE 25 MG: 25 TABLET ORAL at 08:46

## 2017-01-01 RX ADMIN — FUROSEMIDE 40 MG: 40 TABLET ORAL at 08:35

## 2017-01-01 RX ADMIN — LIDOCAINE HYDROCHLORIDE: 20 JELLY TOPICAL at 16:59

## 2017-01-01 RX ADMIN — Medication 2 PUFF: at 21:14

## 2017-01-01 RX ADMIN — Medication 250 MG: at 10:28

## 2017-01-01 RX ADMIN — Medication 10 ML: at 20:22

## 2017-01-01 RX ADMIN — METOPROLOL TARTRATE 25 MG: 25 TABLET ORAL at 08:01

## 2017-01-01 RX ADMIN — ACETAMINOPHEN 650 MG: 325 TABLET ORAL at 06:54

## 2017-01-01 RX ADMIN — TAMSULOSIN HYDROCHLORIDE 0.8 MG: 0.4 CAPSULE ORAL at 10:54

## 2017-01-01 RX ADMIN — LIDOCAINE HYDROCHLORIDE AND EPINEPHRINE 20 ML: 20; 10 INJECTION, SOLUTION INFILTRATION; PERINEURAL at 10:00

## 2017-01-01 RX ADMIN — VITAMIN D, TAB 1000IU (100/BT) 5000 UNITS: 25 TAB at 09:45

## 2017-01-01 RX ADMIN — CLONIDINE HYDROCHLORIDE 0.1 MG: 0.1 TABLET ORAL at 09:06

## 2017-01-01 RX ADMIN — FUROSEMIDE 40 MG: 40 TABLET ORAL at 08:46

## 2017-01-01 RX ADMIN — TAMSULOSIN HYDROCHLORIDE 0.8 MG: 0.4 CAPSULE ORAL at 09:30

## 2017-01-01 RX ADMIN — Medication 2 PUFF: at 21:49

## 2017-01-01 RX ADMIN — DARBEPOETIN ALFA 100 MCG: 100 INJECTION, SOLUTION INTRAVENOUS; SUBCUTANEOUS at 21:56

## 2017-01-01 RX ADMIN — CLONIDINE HYDROCHLORIDE 0.1 MG: 0.1 TABLET ORAL at 08:48

## 2017-01-01 RX ADMIN — TAMSULOSIN HYDROCHLORIDE 0.8 MG: 0.4 CAPSULE ORAL at 09:06

## 2017-01-01 RX ADMIN — Medication 10 ML: at 21:21

## 2017-01-01 RX ADMIN — Medication 2 PUFF: at 07:39

## 2017-01-01 RX ADMIN — ONDANSETRON 4 MG: 2 INJECTION INTRAMUSCULAR; INTRAVENOUS at 02:46

## 2017-01-01 RX ADMIN — THERA TABS 1 TABLET: TAB at 09:06

## 2017-01-01 RX ADMIN — Medication 10 ML: at 22:39

## 2017-01-01 RX ADMIN — METOPROLOL TARTRATE 25 MG: 25 TABLET ORAL at 20:46

## 2017-01-01 RX ADMIN — THERA TABS 1 TABLET: TAB at 08:35

## 2017-01-01 RX ADMIN — VANCOMYCIN HYDROCHLORIDE 1 G: 1 INJECTION, SOLUTION INTRAVENOUS at 18:29

## 2017-01-01 RX ADMIN — PANTOPRAZOLE SODIUM 40 MG: 40 TABLET, DELAYED RELEASE ORAL at 09:06

## 2017-01-01 RX ADMIN — SODIUM CHLORIDE 250 ML: 9 INJECTION, SOLUTION INTRAVENOUS at 18:53

## 2017-01-01 RX ADMIN — TRAMADOL HYDROCHLORIDE 100 MG: 50 TABLET, FILM COATED ORAL at 21:29

## 2017-01-01 RX ADMIN — Medication 250 MG: at 08:41

## 2017-01-01 RX ADMIN — THERA TABS 1 TABLET: TAB at 09:44

## 2017-01-01 RX ADMIN — VANCOMYCIN HYDROCHLORIDE 1 G: 1 INJECTION, SOLUTION INTRAVENOUS at 05:50

## 2017-01-01 RX ADMIN — TAMSULOSIN HYDROCHLORIDE 0.8 MG: 0.4 CAPSULE ORAL at 09:58

## 2017-01-01 RX ADMIN — METOPROLOL TARTRATE 25 MG: 25 TABLET ORAL at 22:27

## 2017-01-01 RX ADMIN — CLONIDINE HYDROCHLORIDE 0.1 MG: 0.1 TABLET ORAL at 08:01

## 2017-01-01 RX ADMIN — VITAMIN D, TAB 1000IU (100/BT) 5000 UNITS: 25 TAB at 08:48

## 2017-01-01 RX ADMIN — FUROSEMIDE 40 MG: 40 TABLET ORAL at 10:55

## 2017-01-01 RX ADMIN — Medication 10000 UNITS: at 09:42

## 2017-01-01 RX ADMIN — PREDNISONE 5 MG: 5 TABLET ORAL at 08:33

## 2017-01-01 RX ADMIN — TRAMADOL HYDROCHLORIDE 50 MG: 50 TABLET, FILM COATED ORAL at 23:50

## 2017-01-01 RX ADMIN — FOLIC ACID 1 MG: 1 TABLET ORAL at 08:35

## 2017-01-01 RX ADMIN — Medication 10000 UNITS: at 08:35

## 2017-01-01 RX ADMIN — Medication 2 PUFF: at 22:56

## 2017-01-01 RX ADMIN — Medication 10000 UNITS: at 10:27

## 2017-01-01 RX ADMIN — Medication 250 MG: at 08:45

## 2017-01-01 RX ADMIN — Medication 10 ML: at 23:07

## 2017-01-01 RX ADMIN — PANTOPRAZOLE SODIUM 40 MG: 40 TABLET, DELAYED RELEASE ORAL at 10:27

## 2017-01-01 RX ADMIN — PANTOPRAZOLE SODIUM 40 MG: 40 TABLET, DELAYED RELEASE ORAL at 10:54

## 2017-01-01 RX ADMIN — TAMSULOSIN HYDROCHLORIDE 0.8 MG: 0.4 CAPSULE ORAL at 10:27

## 2017-01-01 RX ADMIN — Medication 2 PUFF: at 08:42

## 2017-01-01 RX ADMIN — METOPROLOL TARTRATE 25 MG: 25 TABLET ORAL at 21:39

## 2017-01-01 RX ADMIN — ALTEPLASE 2 MG: 2.2 INJECTION, POWDER, LYOPHILIZED, FOR SOLUTION INTRAVENOUS at 07:42

## 2017-01-01 RX ADMIN — PREDNISONE 5 MG: 5 TABLET ORAL at 10:54

## 2017-01-01 RX ADMIN — HYOSCYAMINE SULFATE: 16 SOLUTION at 16:09

## 2017-01-01 RX ADMIN — ENOXAPARIN SODIUM 40 MG: 40 INJECTION SUBCUTANEOUS at 11:37

## 2017-01-01 RX ADMIN — ENOXAPARIN SODIUM 40 MG: 40 INJECTION SUBCUTANEOUS at 12:17

## 2017-01-01 RX ADMIN — CLONIDINE HYDROCHLORIDE 0.1 MG: 0.1 TABLET ORAL at 22:26

## 2017-01-01 RX ADMIN — Medication 10 ML: at 09:39

## 2017-01-01 RX ADMIN — PREDNISONE 5 MG: 5 TABLET ORAL at 09:58

## 2017-01-01 RX ADMIN — VITAMIN D, TAB 1000IU (100/BT) 5000 UNITS: 25 TAB at 10:26

## 2017-01-01 RX ADMIN — Medication 250 MG: at 09:05

## 2017-01-01 RX ADMIN — Medication 10000 UNITS: at 10:56

## 2017-01-01 RX ADMIN — Medication 10 ML: at 09:46

## 2017-01-01 RX ADMIN — Medication 10 ML: at 21:02

## 2017-01-01 RX ADMIN — Medication 250 MG: at 08:00

## 2017-01-01 RX ADMIN — THERA TABS 1 TABLET: TAB at 10:54

## 2017-01-01 RX ADMIN — Medication 10 ML: at 21:05

## 2017-01-01 RX ADMIN — FUROSEMIDE 40 MG: 40 TABLET ORAL at 09:45

## 2017-01-01 RX ADMIN — ENOXAPARIN SODIUM 40 MG: 40 INJECTION SUBCUTANEOUS at 08:49

## 2017-01-01 RX ADMIN — CLONIDINE HYDROCHLORIDE 0.1 MG: 0.1 TABLET ORAL at 23:05

## 2017-01-01 RX ADMIN — ACETAMINOPHEN 650 MG: 325 TABLET ORAL at 02:39

## 2017-01-01 RX ADMIN — HYOSCYAMINE SULFATE: 16 SOLUTION at 08:59

## 2017-01-01 RX ADMIN — Medication 10 ML: at 21:39

## 2017-01-01 RX ADMIN — VITAMIN D, TAB 1000IU (100/BT) 5000 UNITS: 25 TAB at 09:58

## 2017-01-01 ASSESSMENT — PAIN SCALES - GENERAL
PAINLEVEL_OUTOF10: 0
PAINLEVEL_OUTOF10: 7
PAINLEVEL_OUTOF10: 8
PAINLEVEL_OUTOF10: 0
PAINLEVEL_OUTOF10: 0
PAINLEVEL_OUTOF10: 6
PAINLEVEL_OUTOF10: 9
PAINLEVEL_OUTOF10: 4
PAINLEVEL_OUTOF10: 7
PAINLEVEL_OUTOF10: 0
PAINLEVEL_OUTOF10: 7
PAINLEVEL_OUTOF10: 0
PAINLEVEL_OUTOF10: 0
PAINLEVEL_OUTOF10: 7
PAINLEVEL_OUTOF10: 7
PAINLEVEL_OUTOF10: 0
PAINLEVEL_OUTOF10: 7
PAINLEVEL_OUTOF10: 0
PAINLEVEL_OUTOF10: 8
PAINLEVEL_OUTOF10: 0
PAINLEVEL_OUTOF10: 5
PAINLEVEL_OUTOF10: 0
PAINLEVEL_OUTOF10: 8
PAINLEVEL_OUTOF10: 0
PAINLEVEL_OUTOF10: 5
PAINLEVEL_OUTOF10: 7
PAINLEVEL_OUTOF10: 2
PAINLEVEL_OUTOF10: 0
PAINLEVEL_OUTOF10: 4
PAINLEVEL_OUTOF10: 0
PAINLEVEL_OUTOF10: 0
PAINLEVEL_OUTOF10: 8
PAINLEVEL_OUTOF10: 5
PAINLEVEL_OUTOF10: 0
PAINLEVEL_OUTOF10: 5
PAINLEVEL_OUTOF10: 0
PAINLEVEL_OUTOF10: 5
PAINLEVEL_OUTOF10: 7
PAINLEVEL_OUTOF10: 8
PAINLEVEL_OUTOF10: 0

## 2017-01-01 ASSESSMENT — PAIN DESCRIPTION - ORIENTATION
ORIENTATION: MID
ORIENTATION: LOWER

## 2017-01-01 ASSESSMENT — PAIN - FUNCTIONAL ASSESSMENT
PAIN_FUNCTIONAL_ASSESSMENT: 0-10
PAIN_FUNCTIONAL_ASSESSMENT: 0-10

## 2017-01-01 ASSESSMENT — PAIN DESCRIPTION - PAIN TYPE
TYPE: ACUTE PAIN

## 2017-01-01 ASSESSMENT — PAIN DESCRIPTION - LOCATION
LOCATION: BACK
LOCATION: RIB CAGE
LOCATION: ABDOMEN

## 2017-01-01 ASSESSMENT — PAIN DESCRIPTION - FREQUENCY
FREQUENCY: CONTINUOUS
FREQUENCY: CONTINUOUS

## 2017-01-01 ASSESSMENT — PAIN DESCRIPTION - ONSET
ONSET: ON-GOING
ONSET: ON-GOING

## 2017-01-01 ASSESSMENT — PAIN DESCRIPTION - DESCRIPTORS
DESCRIPTORS: CRAMPING

## 2017-01-01 ASSESSMENT — PAIN DESCRIPTION - PROGRESSION: CLINICAL_PROGRESSION: GRADUALLY WORSENING

## 2017-12-11 NOTE — PROGRESS NOTES
1730-Patient arrived to Phase II via chair. Patient awake and alert. Vitals obtained and stable. Report received from [facility]. 1740-Wife to bedside. Patient provided with snack and drink. 1750-Patient provided with belongings. Denies needs at this time. 1800-Discharge instructions reviewed. Verbalized understanding. 1815-Patient discharged in stable condition with wife.

## 2017-12-11 NOTE — OP NOTE
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 Surgeons/Assistants: Dr. Brigido Bustos PA-C    Estimated Blood Loss: 3ml     Complications: none immediately appreciated    Procedure: With the patient lying in the supine position and under adequate local anesthesia consisting of 23 ml of 1% Lidocaine 1:100,000 with epinephrine solution of the donor site of the posterior right arm as well as the squamous cell carcinoma. The carcinoma was excised and sent for fresh frozen evaluation after the area was prepped and draped in the standard surgical fashion. Pathology called back to the room and stated the margin were free. The patient has very thin skin and a large defect which could not be closed primarily, therefore a full thickness skin graft was taken. It was defatted and secured in position with a 4-0 silk suture tie and then a 5-0 fast absorbable suture was placed in a simple running fashion. A Bacitracin Xeroform and moist cotton ball tie over bolster was secured using the tails of the silk sutures. The donor site was closed with a 3-0 Monocryl suture placed in interrupted buried fashion and then Histoacryl respectively. The patient tolerated the procedure well and remained hemodynamically stable throughout the procedure and was quite comfortable throughout the operative course.     Clinical staging for cancer cases:  Tika Yip  Electronically signed by me on 2017 at 5:29 PM

## 2017-12-19 PROBLEM — L89.90 DECUBITUS ULCER: Status: ACTIVE | Noted: 2017-01-01

## 2017-12-19 PROBLEM — L03.312 CELLULITIS OF BACK EXCEPT BUTTOCK: Status: ACTIVE | Noted: 2017-01-01

## 2017-12-19 NOTE — FLOWSHEET NOTE
[facility] requested that this nurse call Dr Victor Half office and see if any pre-op testing was done prior to patients procedure on 12/11/17.

## 2017-12-19 NOTE — FLOWSHEET NOTE
12/19/17 1516   Provider Notification   Reason for Communication Critical Value (comment)   Provider Name Dr Malka Yin   Provider Notification Physician   Method of Communication Call   Response See orders   Notification Time [phone]       Notified of hgb and hct.  New orders for chest xray, type and cross match, transfuse 2 units of RBCs

## 2017-12-19 NOTE — PLAN OF CARE
Problem: Skin Integrity:  Goal: Absence of new skin breakdown  Absence of new skin breakdown  Outcome: Ongoing  In to see patient after being consulted for a wound to the right arm, left buttock, and mid lumbar spine. Patient has a prior to hospitalization unstageable pressure injury to his mid lumbar spine. According to wife area has been there for several months. Patient saw Dr. Ruben Estrada in the office today and was admitted for this wound. Patient's spine protrudes in this area. 16x7.6 cm area of redness around the unstageable open wound-see photo. Patient is to go down for CT testing- abd pad secured over the area. Patient's right arm is wrapped in an ace wrap. Primary nurse states Dr. Melony Shannon gave orders to not remove dressing. Patient had skin cancer removal surgery to lower arm recently. Patient was able to turn to his side with assist to assess his coccyx area. Area is red and blanchable with peeling skin from moisture assoc skin damage. Unstageable dark pressure injury vs moisture lesion to left buttock. Wife states this area has been there for awhile. Will have staff use EPC cream and moisture wicking chux under patient. Order for SPR for bed and waffle cushion for chair and back of chair for spine. Compression socks removed. Patient noted to have bruising from left lower leg. Right lower leg with scabbing near medial ankle from patient scratching himself when he puts hose on. Instructed nurse to obtain orders regarding leg compression. And to obtain wound care orders for spine from Dr. Rbuen Estrada. Nurse obtaining supplies for Dr. Ruben Estrada to do a bedside debridement of lumbar wound. Care plan reviewed with patient and RN. Patient and RN verbalize understanding of the plan of care and contribute to goal setting.       Wound 12/19/17 Back Mid;Medial unstageable (Active)   Wound Image   12/19/2017  2:31 PM   Wound Type Wound 12/19/2017  2:31 PM   Wound Unstageable 12/19/2017  2:31 PM   Dressing Status Changed 12/19/2017  2:31 PM   Dressing Changed Changed/New 12/19/2017  2:31 PM   Dressing/Treatment Dry dressing 12/19/2017  2:31 PM   Wound Length (cm) 6 cm 12/19/2017  2:31 PM   Wound Width (cm) 2 cm 12/19/2017  2:31 PM   Wound Depth (cm)  0.6 12/19/2017  2:31 PM   Calculated Wound Size (cm^2) (l*w) 12 cm^2 12/19/2017  2:31 PM   Tunneling Position ___ O'Clock 3 12/19/2017  2:31 PM   Undermining Starts ___ O'Clock 6 12/19/2017  2:31 PM   Undermining Ends___ O'Clock 0.6 12/19/2017  2:31 PM   Wound Assessment Drainage;Red;Slough 12/19/2017  2:31 PM   Drainage Amount Small 12/19/2017  2:31 PM   Drainage Description Serosanguinous 12/19/2017  2:31 PM   Odor None 12/19/2017  2:31 PM   Michelle-wound Assessment Blanchable erythema;Purple 12/19/2017  2:31 PM   Red%Wound Bed 50 12/19/2017  2:31 PM   Yellow%Wound Bed 50 12/19/2017  2:31 PM   Number of days: 0       Wound 12/19/17 Buttocks Left unstageable with MASD (Active)   Wound Image   12/19/2017  2:31 PM   Wound Type Wound 12/19/2017  2:31 PM   Wound Unstageable 12/19/2017  2:31 PM   Dressing/Treatment Protective barrier 12/19/2017  2:31 PM   Wound Length (cm) 0.5 cm 12/19/2017  2:31 PM   Wound Width (cm) 0.3 cm 12/19/2017  2:31 PM   Wound Depth (cm)  scab 12/19/2017  2:31 PM   Calculated Wound Size (cm^2) (l*w) 0.15 cm^2 12/19/2017  2:31 PM   Drainage Amount None 12/19/2017  2:31 PM   Black%Wound Bed 100 12/19/2017  2:31 PM   Number of days: 0       Incision 12/11/17 Arm Right (Active)   Wound Assessment JOLEEN 12/19/2017  2:31 PM   Michelle-wound Assessment JOLEEN 12/19/2017  2:31 PM   Closure JOLEEN 12/19/2017  2:31 PM        Dressing/Treatment Ace Wrap 12/19/2017  2:31 PM        Number of days: 7         Comments:   Lumbar wound    Coccyx area

## 2017-12-20 NOTE — CONSULTS
Historical Provider, MD   cloNIDine (CATAPRES) 0.2 MG tablet Take 0.2 mg by mouth 2 times daily. Yes Historical Provider, MD   albuterol (PROVENTIL HFA;VENTOLIN HFA) 108 (90 BASE) MCG/ACT inhaler Inhale 2 puffs into the lungs every 6 hours as needed. Yes Historical Provider, MD   Multiple Vitamins-Minerals (MULTIVITAMIN PO) Take 1 tablet by mouth daily. Last dose 3- for surgery 3-   Yes Historical Provider, MD   OXYGEN Inhale 2 L into the lungs continuous     Historical Provider, MD       Review of Systems:  Source of data: Patient  Constitutional: Denies fever, chills, Reports malaise, fatigue. HEENT: Denies visual changes, runny nose, sore throat, dysphagia, [place]q, tinnitus. Chest: Reports SOB, sputum production, cough. Cardiovascular: Denies palpitations, chest pain or pressure. GI: Denies abdominal pain, nausea, vomiting, diarrhea, constipation. : Denies hematuria, foamy urine, hesitancy, or painful urination. Skin: Denies wounds, rashes, redness. Musculoskeletal: Denies pain, swelling, tingling/numbness, weakness. Neurological: Denies dizziness, light headedness, syncope, confusion, numbness, tingling, gait disturbance.      Current Meds:  Infusion:    bacitracin      sodium chloride 50 mL/hr at 12/20/17 0031     Meds:    [START ON 12/21/2017] vancomycin  1 g Intravenous Q36H    vitamin C  250 mg Oral Daily    mometasone-formoterol  2 puff Inhalation BID    cloNIDine  0.2 mg Oral BID    furosemide  40 mg Oral Daily    metoprolol tartrate  25 mg Oral BID    multivitamin  1 tablet Oral Daily    pantoprazole  40 mg Oral Daily    potassium chloride  10 mEq Oral Every Other Day    predniSONE  5 mg Oral Daily    vitamin A  10,000 Units Oral Daily    sodium chloride flush  10 mL Intravenous 2 times per day    lidocaine-EPINEPHrine  20 mL Intradermal Once    lidocaine 1 % injection  5 mL Intradermal Once    pneumococcal 13-valent conjugate  0.5 mL Intramuscular Once    influenza virus vaccine  0.5 mL Intramuscular Once    vancomycin (VANCOCIN) intermittent dosing (placeholder)   Other RX Placeholder    folic acid  1 mg Oral Daily     Meds prn: bacitracin, albuterol sulfate HFA, sodium chloride flush, acetaminophen, magnesium hydroxide, ondansetron, sodium hypochlorite     Allergies/Intolerances: ALLERGIES: Review of patient's allergies indicates no known allergies. Lab Data  CBC:   Recent Labs      12/19/17   1446  12/20/17 0214  12/20/17   0919   WBC  5.6  4.2*   --    HGB  6.2*  6.6*  9.0*   HCT  18.6*  19.8*   --    PLT  183  181   --      BMP:  Recent Labs      12/19/17   1446  12/20/17   0214   NA  141  139   K  4.3  4.1   CL  96*  97*   CO2  37*  36*   BUN  38*  42*   CREATININE  1.9*  1.9*   GLUCOSE  109*  105   CALCIUM  9.1  8.6     PTH: @PTH@  TSH: No results for input(s): TSH in the last 72 hours. HgBa1c: No results for input(s): LABA1C in the last 72 hours. Hepatic: Recent Labs      12/19/17   1446  12/20/17   0214   LABALBU  3.6  2.7*   AST  17  15   ALT  9*  9*   BILITOT  0.2*  0.3   ALKPHOS  64  65     Results for Miriam Bryant (MRN [id number]) as of 12/20/2017 14:06   Ref. Range 12/19/2017 14:46   Ferritin Latest Ref Range: 22 - 322 ng/mL 473 (H)   Iron Latest Ref Range: 65 - 195 ug/dL 54 (L)     CT thoracic spine 12/19/17  Impression   1. There is soft tissue swelling overlying the spinous processes of the thoracic spine. Specifically, the area of soft tissue swelling overlying the spinous processes between the scapulae, in the area of reported concern, demonstrate no definite evidence    of osseous erosion. If there is clinical concern for osteomyelitis, further characterization could be obtained with MRI. 2. Multiple anterior wedge compression deformities are demonstrated involving the T3-T10 levels. There is also mild anterior wedge compression deformity at T12.  The anterior wedge compression deformities of T5 and T6 demonstrate a cortical step-off along    the superior endplates suggesting possible acute on chronic fracture. Correlate clinically for point tenderness at these locations. 3. Severe bilateral emphysematous changes noted. 4. Small bilateral pleural effusions are demonstrated. 12/19/17 thoracic spine xray  Impression   1. Multiple anterior wedge compression deformities involving multiple thoracic levels from T4 through T10. These are of indeterminate age. There is resultant accentuated thoracic kyphosis. No definite erosive changes are identified. However, please refer    to thoracic spine from same day for further description. 12/19/17 Chest xray   Impression   1. Lungs are hyperinflated and fibroemphysematous in appearance. Heart within normal limits for size. 2. Small bladder pleural effusions versus chronic pleural thickening. Mildly increased thoracic kyphosis. Osteoporosis. Multiple compression fractures in the mid and upper thoracic spine, likely chronic. 3. Mild hazy parenchymal densities right perihilar region and both lung bases which could represent fibrosis versus atelectasis/pneumonia. 4. Progress imaging recommended. Component Results     Component Collected Lab   Gram Stain Result 12/19/2017  6:20  [facility]   Rare segmented neutrophils observed. No epithelial cells observed. Many gram positive cocci occurring singly and in pairs. Organism  (Abnormal) 12/19/2017  6:20  [facility]   Staphylococcus (coagulase positive)     Aerobic Culture 12/19/2017  6:20  [facility]   heavy growth    Testing Performed By     Sunita Campos Name Director Address Valid Date Range   [id number] [id number] Pardeep Suh MD [address] [place] [address] 08/30/17 1255-Present   Narrative     Source: wound       Site: back          Current Antibiotics: Vancomycin, Ciprofloxacin       24HR INTAKE/OUTPUT: Intake/Output Summary (Last 24 hours) at 12/20/17 1237  Last data filed at 12/20/17 0941   Gross per 24 hour   Intake             1285 ml   Output              580 ml   Net              705 ml     I/O last 3 completed shifts: In: 795 [I.V.:545; Blood:250]  Out: 580 [Urine:580]  I/O this shift: In: 490 [P.O.:180; Blood:310]  Out: -   Admission weight: 138 lb 9.6 oz (62.9 kg)  Wt Readings from Last 3 Encounters:   12/19/17 138 lb 9.6 oz (62.9 kg)   12/11/17 147 lb (66.7 kg)   08/16/17 148 lb 3.2 oz (67.2 kg)     Body mass index is 21.07 kg/m². Physical Examination:  VITALS:  /65   Pulse 66   Temp 97.4 °F (36.3 °C) (Oral)   Resp 17   Ht 5' 8\" (1.727 m)   Wt 138 lb 9.6 oz (62.9 kg)   SpO2 99%   BMI 21.07 kg/m²   Weight:   Wt Readings from Last 3 Encounters:   12/19/17 138 lb 9.6 oz (62.9 kg)   12/11/17 147 lb (66.7 kg)   08/16/17 148 lb 3.2 oz (67.2 kg)     Constitutional: alert and cooperative with exam, appears comfortable, no distress  Head: NC/AT   Eyes: no icteric sclera, no pallor conjunctiva, no discharge seen from either eye  Oral: moist oral mucus membranes  Ears: normal external appearance of left and right ear, both ear lobules nontender to palpation  Nose: no active drainage  Neck: No jugular venous distention, appears symmetric, good ROM  Lungs: Air entry B/L, no crackles or rales, some chronic sounding expiratory wheezing noted with scattered rhonchi in bilateral posterior bases. Heart: regular rate and rhythm, S1, S2, no murmurs rubs or gallops. Extremities: 1+ bilateral pedal LE edema  GI: soft, non-tender, no guarding  : Normal male genitalia  Skin: no rash seen on exposed extremities  Musculo: moves all extremities  Neuro: no slurred speech, no facial drooping, symmetric strength  Psychiatric: Awake, alert, Oriented    Impression and Plan:    1.  CKD vs COREEN, likely CKD III due to hypertensive and senile nephrosclerosis, but has recently had infection, been on antibiotics and had a low appetite as well as being on Lasix and having profound anemia on admission.   - Review of available labs shows creatinine range from 1.5-2.1 over the last two years. He did have one reading of 2.4 in 9/25/17.   - Will send urine sodium, urine chloride, U/A with reflex for evaluation.   - Continue 0.9% NS at 50 mL/hr for now. - Toma Shepherd for PICC per Dr. Roxy Hernandez orders.   - Get baseline renal U/S.   - Avoid NSAID's, non-emergent IV contrast dye, fleets phospho enema, ACE, ARB for now. - Will continue Lasix as pt has fairly significant edema of the lower legs and seems frail. If evaluation points towards COREEN rather than CKD, will hold and give PRN. - Reasonable UOP so far, will follow with strict I&O, daily weights.   - BMP In am.   - Educated concerning etiologies for CKD. Long discussion.   - Get records from the past two years labs, office visits, radiology and medication lists from PCP please. 2. Anemia, severe, unclear etiology, Hg 6.2 on admission, up to 9 with 2 u PRC transfusion.   - Appears pt has been anemic for the past two years, with Hg 7.3-8.2 during that time, points towards chronicity. - Review of ferritin 473, and iron 54, shows reasonable to consider CHACE concurrent with possibly chronic disease state. - Will send stool for occult blood, vitamin d level, and iron saturation in am.   - Educated pt and spouse concerning the possibility of chronicity and CHACE, much time spent in education in this consultation.   - H&H in am.     3. Probable CHF, on chronic Lasix, pleural effusions on CT scan   - Continue Lasix. 4. COPD on home o2 2 lpm. Continue as ordered. 5. HTN, controlled, with -120's. Continue Clonidine. 6. PMH skin cancer   7. CAD with PMH MI  8. PMH AAA with repair 2006  9. PMH DVT 2006    Discussed with Dr. Kristopher Frankel. All labs, radiology and medications were reviewed and discussed with the patient, spouse and RN caring for the pt. Thank you for the consult.

## 2017-12-20 NOTE — PROGRESS NOTES
Vancomycin Day: 2  Current Dosin mg Q24H     Cultures/Sensitivities:  Date Source Result   17 Back wound Many GPC singly and pairs   17 Blood X 2  No growth- prelim           Patient's labs, cultures, vitals, and vancomycin regimen reviewed. No changes today.  Scr stable at 1.9; 580 ml urine yesterday    Planning debridement and wound VAC   No osteo per CT   Change Vancomycin 1000 mg IV Q36H, will plan to check trough prior to 3rd overall dose, not ordered yet.       Frank Owens PharmD 2017 10:39 AM

## 2017-12-20 NOTE — PROGRESS NOTES
disease. 8.  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  9.  Coccyx area unstageable ulcer present on admission in association with moisture-related skin damage. Plan   Continue current antibiotics with vanco   Follow cultures  Monitor H&H S/P transfusion  Macrocytic anemia workup    Stool occult blood   Continue wound care with dakins  Plastics to eval the right arm and I d/w  about the thoracic ulcer   D/w    Plan debridement at bed side   Renny vs tcu eval   Please see today's orders for updated patient plan of care. Noted scribed in real time of face to face encounter by Dr Nadira Barton, transcribed by Media Goldmann, APRN in [facility] after rounds were completed. Electronically signed by Priti Morse CNP on 12/20/2017 at 12:30 PM    I have reviewed and agree with the above note. The information is true and accurate.    Electronically signed by Wayne Hall MD on 12/20/17 at 6:40 PM

## 2017-12-20 NOTE — H&P
[address] [place] [address]                               HISTORY AND PHYSICAL    PATIENT NAME: Joseph Wynn                     :        1933  MED REC NO:   [id number]                           ROOM:       0031  ACCOUNT NO:   [de-identified]                           ADMIT DATE: 2017  PROVIDER:     Dalton Alas M.D. HISTORY OF PRESENT ILLNESS:  The patient is a nice 79-year-old male with  history of problems with his thoracic scoliosis and chronic problems with  breathing and he does sit for prolonged times in his recliner and he has  developed ulcer in the thoracic spine that started in 2017. The patient  was seen and evaluated in the past by Dr. Anival Knott and he is referred for  further evaluation and initially seen in our office on 2017. The  patient also appears to have improved initially, but after a few visits  over the last month, his ulcer continued to worse. The patient unfortunately sits and reclines right over the ulcer site and  his scoliosis essentially is not helping it. The patient had cultures done  on 2017 and that showed MRSA and the patient presented to the office  with continued worsening of the cellulitis, so decision was made to admit  him to the hospital for further evaluation and treatment. The patient has  been noted to have problems with his elevated creatinine and also is not  noted to have significant problems with anemia with hemoglobin of 6.2. PAST MEDICAL HISTORY:  Significant for history of cleared arthritis, skin  cancer, and coronary artery disease. Also significant for COPD and history  of blood transfusion in . PAST SURGICAL HISTORY:  Include aortic surgery and history of heart stents  and hernia surgery. FAMILY HISTORY:  Otherwise, noncontributory. PERSONAL HISTORY:  A former smoker, not an alcohol or drug abuser.     CURRENT MEDICATIONS:  On to have MRSA on  cultures done on 12/08/2017. DIAGNOSTIC IMPRESSION:  1. Thoracic spine/upper back cellulitis. 2.  Unstageable thoracic ulcer with pressure ulcer to the back that is  worsening. 3.  Cellulitis of the skin of the back, thoracic region. 4. MRSA infection. 5.  Chronic kidney disease. 6.  Severe anemia with hemoglobin 6.2.  7.  Coronary artery disease. 8.  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  9.  Coccyx area unstageable ulcer present on admission in association with  moisture-related skin damage. PLAN:  We will send prealbumin level, get PICC line, and get renal consult. The initial plan was to debride the ulcer and plan on application of wound  VAC, but depending upon the clinical status, we will hold off on  debridement given the anemia. We will hold off on Lovenox. We will send serum iron, ferritin level, and  give the patient vitamin supplementation with multivitamin, folic acid,  O78, and we will apply SCDs to the leg instead of Lovenox and reevaluate  the patient's clinical status and discuss with Dr. Chester Palencia about the care  plan and course of treatment. We will at this point only do Dakin's to the thoracic area and start  vancomycin and follow the creatinine daily. If the creatinine worsens,  change the patient's vancomycin to daptomycin. Care plan and course of  treatment discussed with the patient and the wife at the bedside. We will  also send stool occult blood testing. Lidya Juarez M.D.    D: 12/19/2017 17:47:51       T: 12/19/2017 20:39:02     [email or web address]  Job#: [id number]     Doc#: [id number]    CC:  BROOKE Molina M.M., M.D. Rosey Shorten, M.D.

## 2017-12-20 NOTE — PROGRESS NOTES
Yana Dickens 60  INPATIENT OCCUPATIONAL THERAPY  [facility] 8B  EVALUATION    Time:  Time In: 1340  Time Out: 1409  Timed Code Treatment Minutes: 15 Minutes  Minutes: 29          Date: 2017  Patient Name: Jennifer Oliva,   Gender: male      MRN: [id number]  : 1933  (80 y.o.)  Referring Practitioner: Amarjit Rodriguez MD  Diagnosis: decubitus ulcer  Additional Pertinent Hx: 80-year-old male with history of problems with his thoracic scoliosis and chronic problems with breathing and he does sit for prolonged times in his recliner and he has developed ulcer in the thoracic spine that started in 2017. Restrictions/Precautions:  Restrictions/Precautions: General Precautions, Contact Precautions, Isolation            Position Activity Restriction  Other position/activity restrictions: ulcer on back          Past Medical History:   Diagnosis Date    [facility]) [facility]     CAD (coronary artery disease)     Cancer ([facility] [address]     skin    COPD (chronic obstructive pulmonary disease) (HCC)     uses 2 liters oxygen continuously    History of blood transfusion     following surgery on scalp    Hx of blood clots 2006    after aorta surgery    --   in leg, right? ?  stent inserted    Hypertension     MI (myocardial infarction)     x2     Past Surgical History:   Procedure Laterality Date    AORTA SURGERY  ??    BIOPSY EXTERNAL EAR Left 2017    EXCISION SQUAMOUS CELL CARCINOMA OF THE LEFT EAR HELIX WITH FROZEN SECTION  AND SHAVE OF SUSPICIOUS NONHEALING SQUAMOUS CELL RIGHT FOREARM performed by Nikolas Vilchis MD at [place] [id number]    [address] [place] [address]      Aorta Surgery    CARDIAC SURGERY  2006    Stent x1    EXCISION / BIOPSY SKIN LESION OF ARM Right 2017    EXCISION SCC RIGHT FOREARM WITH FLAP performed by Nikolas Vilchis MD at [address] [place] [address] [place]  ???    bilateral cataract    HEMORRHOID SURGERY  ???    x2    HERNIA REPAIR  1980's???    x2 - right??    OTHER SURGICAL HISTORY  12/05/2016    Excision of squamous cell carcinoma of right lateral chest, and biopsy of right forearm     OTHER SURGICAL HISTORY Left 02/20/2017    Excision SCC zygomatic arch with skin graft    OTHER SURGICAL HISTORY Left 08/16/2017    Excision SCC of left ear helix with frozen section    OTHER SURGICAL HISTORY Right 12/11/2017    Excision squamous cell carcinoma right forearm with flap and frozen section    PRE-MALIGNANT / BENIGN SKIN LESION EXCISION  3/27/2015    BCC Scalp    SKIN BIOPSY      SKIN CANCER EXCISION Left 10/31/2014    squamous cell left ear x 2  with full thickness skin graft and frozen section    TONSILLECTOMY AND ADENOIDECTOMY      as a child           Subjective  Chart Reviewed: Yes (H&P, orders, images)    Subjective: RN okayed session. Pt at EOB, wife present, min encouragement to participate, \" I am not an invalid\" educated pt on role of OT and purpose in acute care. Pt agreeable to eval     General:  Overall Orientation Status: Within Functional Limits    Vision: Within Functional Limits    Hearing: Exceptions to [facility]  Hearing Exceptions: Hard of hearing/hearing concerns         Pain:   denies       Social/Functional:  Lives With: Spouse  Type of Home: House  Home Layout: One level  Home Access: Stairs to enter with rails  Entrance Stairs - Number of Steps: 1 VIK  Home Equipment: Rolling walker (no AD PTA, RW for community mobility )     Bathroom Shower/Tub: Tub/Shower unit  Bathroom Toilet: Handicap height  Bathroom Equipment: Grab bars in shower, Shower chair       ADL Assistance:  (pt reoprts having assist to don compression stockings)  Homemaking Assistance: Independent (mows grass, )       Ambulation Assistance: Independent  Transfer Assistance: Independent    Active :  Yes  Additional Comments: kids and grandkids live nearby to assist prn , pt assist with

## 2017-12-20 NOTE — PROGRESS NOTES
A full consult will be dictated. Significant anemia requiring blood transfusion. Renal insuffiencey,chronic infection are contributing factors. No iron deficiency. Start on Aranesp. I will follow. Thank you.

## 2017-12-20 NOTE — CARE COORDINATION
17, 12:44 PM      Jackie Escobar       Admitted from: Direct Admit 2017/  Rosa Henson day: 1   Location: [facility] Reason for admit: Decubitus ulcer [L89.90]  Cellulitis of back except buttock [L03.312] Status: INpt  Admit order signed?: yes  PMH:  has a past medical history of AAA (abdominal aortic aneurysm) ([facility] [address] CAD (coronary artery disease); Cancer [facility]); COPD (chronic obstructive pulmonary disease) ([facility] [address] History of blood transfusion; Hx of blood clots; Hypertension; and MI (myocardial infarction). Procedure: No  Pertinent abnormal Imagin17 CT Thoracic Spine. Impression   1. There is soft tissue swelling overlying the spinous processes of the thoracic spine. Specifically, the area of soft tissue swelling overlying the spinous processes between the scapulae, in the area of reported concern, demonstrate no definite evidence    of osseous erosion. If there is clinical concern for osteomyelitis, further characterization could be obtained with MRI. 2. Multiple anterior wedge compression deformities are demonstrated involving the T3-T10 levels. There is also mild anterior wedge compression deformity at T12. The anterior wedge compression deformities of T5 and T6 demonstrate a cortical step-off along    the superior endplates suggesting possible acute on chronic fracture. Correlate clinically for point tenderness at these locations. 3. Severe bilateral emphysematous changes noted. 4. Small bilateral pleural effusions are demonstrated. 17 CXR   1. Lungs are hyperinflated and fibroemphysematous in appearance. Heart within normal limits for size. 2. Small bladder pleural effusions versus chronic pleural thickening. Mildly increased thoracic kyphosis. Osteoporosis. Multiple compression fractures in the mid and upper thoracic spine, likely chronic.    3. Mild hazy parenchymal densities right perihilar region and both lung bases which could represent fibrosis versus program?  No  Type of Home Care Services:  None  Patient expects to be discharged to:  Home with wife  Expected Discharge date:  12/22/17  Follow Up Appointment: Best Day/ Time: Wednesday AM    Discharge Plan: Spoke with pt and spouse. Comes from home with no services and spouse states they do not need any as they have many family members to help. Pt has several walkers. He has a PCP. Medical plan is uncertain at present. CM to continue to follow for potential needs.       Evaluation: no

## 2017-12-20 NOTE — PLAN OF CARE
Problem: Falls - Risk of  Goal: Absence of falls  Outcome: Ongoing  Pt has remained free from falls this shift. Problem: Skin Integrity:  Goal: Absence of new skin breakdown  Absence of new skin breakdown   Outcome: Ongoing  Pt's skin assessment complete, see flowchart. Pt has no new skin breakdown assessed this shift.

## 2017-12-20 NOTE — PROCEDURES
Procedure Note Debridement of ulcer    Pt Name: Darron Chavez  MRN: [id number]  [id number]  YOB: 1933  Admit Date: 12/19/2017  1:11 PM  Date of evaluation: 12/20/2017  Primary Care Physician: Megan Lyn MD   [id number]       Assessment:    1.  Thoracic spine/upper back cellulitis. 2. Stage 3 upper back cellulitis   3.  Cellulitis of the skin of the back, thoracic region. 4.  MRSA infection. 5.  Chronic kidney disease. 6.  Severe anemia with hemoglobin 6.2.  7.  Coronary artery disease. 8.  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 9.  Coccyx area unstageable ulcer present on admission in association with moisture-related skin damage. · Patient Active Problem List:  ·    Hearing loss  ·    Acute kidney injury ([address]  ·    Anemia  ·    Scalp wound  ·    Squamous cell skin cancer  ·    Decubitus ulcer  ·    Cellulitis of back except buttock  ·   ·   Procedure Note: Debridement of the Site: upper back pressure ulcer    This procedure has been fully reviewed with the patient and written informed consent has been obtained, time out done       ProcedureExcisional Debridement    The ulcer was anesthestized with2% lidocaine with epinephrine  instrument used:scapel, scissors, curette;debridement depth:subcutaneous      Procedure details:using forceps curette and scissors excisional debridement done into skin subcutaneous level of the Site: upper back thoracic ulcer     Patient tolerated the procedure well. Bleeding controlled by application of pressure. Dressing was applied. Discussed care plan with the nursing staff. Post procedure instruction, and pain management issues were discussed with the patient. Tissue cultures were sent. Pre debridement - 5.1x 2 x 1  Post debridement - 5.2 x 2.2 x 1.2     All questions and concerns addressed.           Electronically signed by Raj Trinidad MD on 12/20/2017 at 6:52 PM

## 2017-12-21 NOTE — PROGRESS NOTES
[address] [address]  Transitional Care Unit Preadmission Assessment    Patient Name: Rober Hugo        MRN: [id number]    [place]: [de-identified]    : 1933  (80 y.o.)  Gender: male     Admitted From:  [x][facility] []Outside Admission - Location:  Date of Admission to the hospital:2017  Date patient eligible for admission: 2017  Primary Diagnosis  Thoracic spine/upper back cellulitis   Did patient have surgery? [x] Yes- Explain: 4cm excision of squamous cell carcinoma that was repaired with full thickness   skin graft (6 cm2). [] No    Physicians:  Dr. Latasha Sandoval, Dr. Claudene Byars, Dr. James Black for clinical complications/co-morbidities:   Past Medical History:   Diagnosis Date    AAA [facility]ab[facility] aneurysm) ([facility] [address]     CAD (coronary artery disease)     Cancer ([facility] [address]     skin    COPD (chronic obstructive pulmonary disease) ([facility] [address]     uses 2 liters oxygen continuously    History of blood transfusion     following surgery on scalp    Hx of blood clots     after aorta surgery    --   in leg, right? ?  stent inserted    Hypertension     MI (myocardial infarction) 18's    x2       Financial Information  Primary insurance:  [x]Medicare [] Medicare HMO  []Commercial insurance    []Medicaid   []Workers Compensation      Secondary Insurance:  []Medicare [] Medicare HMO  [x]Commercial insurance    []Medicaid   []Workers Compensation []None    Precautions:   []Cardiac Precautions  []Total hip precautions    []Weight Bearing status:  [x]Safety Precautions/Concerns  []Visually impaired   []Hard of Hearing     Isolation Precautions:         [x]Yes  []No  If Yes:  [] Droplet  [x]Contact []Airborne                   []VRE          [x]MRSA               []C-diff         [] TB  [] Other:       Skills:   []Physical therapy     []Occupational Therapy   [x]IV Antibiotics  3 weeks antibiotics  [] IV meds   [] TPN     []PEG tube Feedings      []New Colostomy   [] Ileostomy care/teaching    [] Speech Therapy   [x]Wound Vac   [x]Complex Dressing Changes    []Terminal care    []Other       Has patient had Prior Skilled care in the Last 60 days:  []Yes  [x]NO   If Yes:   Skilled Facility:    How many skilled days were used?

## 2017-12-21 NOTE — CARE COORDINATION
Called Celestina from [facility] for quick look  Electronically signed by Max Barney RN on 12/21/2017 at 11:32 AM

## 2017-12-21 NOTE — FLOWSHEET NOTE
12/21/17 1730   Provider Notification   Reason for Communication Review case   Provider Name Abdon Laguna   Provider Notification Physician Assistant   Method of Communication Call   Response Other (Comment)   Notification Time [phone]     Notified about consult from Dr. Ellie Bagley. Stated to add patient to Dr. Trip Alvarez list and they would see tomorrow.

## 2017-12-21 NOTE — PROCEDURES
PICC Procedure Note    Lisa Cedeño   Admitted- 12/19/2017  1:11 PM  Admission diagnosis- Decubitus ulcer [L89.90]  Cellulitis of back except buttock [C30.983]      Attending Physician- Kathe Quick MD  Ordering Physician-same  Indication for Insertion: Antibiotic Therapy    Catheter Insertion Date- 12/21/2017   Lot Number- [id number]  Gauge-5  Lumen-dual    Insertion Site- KELLI Cephalic  Vein Diameter- 1.40 mm  Catheter Length- 45 cm  Internal Length- 42 cm  Exposed Catheter Length- 3cm   Upper Arm Circumference- 23cm  Tip Confirmation System Bundle met- Yes  If X-ray required, Tip Location- na  Radiologist- na    PICC insertion successful- Yes  Ultrasound- yes    Okay To Use PICC- Yes    Electronically signed by Enrique Tony RN on 12/21/2017 at 3:40 PM

## 2017-12-21 NOTE — CONSULTS
applied. WORKUP:  White blood cell count of 4.2. Most recent hemoglobin is 9.0. It  was 6.6 earlier this morning. His prealbumin was 14.8. PLAN:  Dr. Anival Knott had a lengthy discussion with the wife as well as the  patient regarding the fact that he would recommend continuing with wound  care as recommended by Dr. Chandrika Schofield for the pressure ulcer over his  thoracic spine. For the right arm skin grafting, I also instructed the  nurse to perform dressing changes twice daily with Adaptic and bacitracin  applied over the skin graft with a bulky dry dressing applied thereafter. We will see the patient in the hospital if he remains inpatient status or  we will see him on an outpatient basis next week for repeat evaluation. All of the patient's and the patient's wife questions have been answered to  their satisfaction.         HAO Diaz    D: 12/20/2017 16:57:40       T: 12/20/2017 18:18:08     [email or web address]  Job#: [id number]     Doc#: [id number]    CC:  MD Dalton Metcalf M.D.

## 2017-12-21 NOTE — PROGRESS NOTES
Dr. Ellie Bagley called to meet to possibly place a wound vac to patients spine ulcer. NPWT supplies ordered. Arrived to floor patient lying in bed. Wedge pillow at bedside. Assisted Dr. Ellie Bagley to create an offloading foam to support patients curvature of the spine and to offload unstageable. Dr. Ellie Bagley partially debrided ulcer bed yesterday. Sat patient up on the side of the bed. Dr. Ellie Bagley removed dressing. Ulcer noted to have Fascia exposed. Ulcer measured 5.5 cm x 2.2 cm x 0.8 cm. Ulcer bed was noted to be bleeding. At this time a pressure dressing was applied by Dr. Ellie Bagley. Dr. Ellie Bagley ordered to apply wound VAC tomorrow in the am. NPWT in room for tomorrows dressing change. Repositioned patient in bed with offloading wedge under spine. Applied additional pillows under patients head. SPR mattress inflated, and bed alarm set. Updated primary RN. Will continue to follow. Plans for patient to be transferred to TCU. Care plan reviewed with patient and RN. Patient and RN verbalize understanding of the plan of care and contribute to goal setting.

## 2017-12-21 NOTE — PROGRESS NOTES
Spoke with Dr. Vera Freeman regarding planned admission to [facility] tomorrow. 8B staff updated.

## 2017-12-21 NOTE — PLAN OF CARE
Problem: Falls - Risk of  Goal: Absence of falls  Outcome: Ongoing  No falls this shift    Problem: Skin Integrity:  Goal: Absence of new skin breakdown  Absence of new skin breakdown   Outcome: Ongoing  No new open areas    Problem: Risk for Impaired Skin Integrity  Goal: Tissue integrity - skin and mucous membranes  Structural intactness and normal physiological function of skin and  mucous membranes. Outcome: Ongoing  Continue with wound care as ordered    Comments: Care plan reviewed with patient and wife. Patient and wife verbalize understanding of the plan of care and contribute to goal setting.

## 2017-12-21 NOTE — FLOWSHEET NOTE
12/21/17 1333   Provider Notification   Reason for Communication Review case   Provider Name Dr. Ellie Bagley   Provider Notification Physician   Method of Communication Secure Message   Response Waiting for response   Notification Time [phone]     Patient approved for TCU tomorrow

## 2017-12-21 NOTE — PROCEDURES
A Bladder scan was performed @ 3309 on 12-21-17. The patient's last void was @ 0306 on 12-21-17  AMT: 25 ML's. The residual amount was measured to be 868 ML. Report of results was given to [facility]. *Patient refused to be placed into the supine position for the bladder   Scan to be performed. Bladder was scanned with the patient in the   Semi fowlers position with the nurse being informed that this might influence   Readings. The bladder was scanned from multiple angles with the three  Highest clear readings being 868,868,and 858 ML's.

## 2017-12-21 NOTE — PROGRESS NOTES
[facility]    [address], [address]  [address] [place] Progress Note  12/21/2017 3:33 PM    Pt Name:    Darron Chavez  MRN:     [id number]   [id number]  YOB: 1933  Admit Date:    12/19/2017  1:11 PM  Primary Care Physician:  Megan Lyn MD    Reason for Consult:  Violetta Parents Chief Complaint: Wound infection    History:   The patient is a 80y.o. year old frail elderly appearing  male who reports a history of AAA dissection/repair, hypertension, multiple skin cancers, COPD on home o2 2 lpm, previous tobacco abuse quit in 1980's, who tells me he has been having severe dyspnea on exertion from his COPD and has been remaining in his recliner as much as possible as he breathes best there. Therefore he has developed a wound on his spine that he has followed with Dr. Hernandez Dove for since July 18, 2017. Then he developed sinus symptoms, cough productive of more clear-white sputum, sore throat, shortness of breath that was worse with exertion and went to his PCP on 11/17 who gave him 10 days of antibiotics and \"stronger water pill that fixes him up every time this happens\". She reports he got better. His wife reports that he had also a skin cancer excised from his arm recently. He was also on Cipro December 11th-19th after an I&D on the 8th with Dr. Hernandez Dove. . This was rechecked on the 19th, and was found to be worsening, so he was admitted for IV antibiotics. He and his wife report they \"do not doctor\" and they have only had very rare blood work for his skin cancer excision procedures. They state having medical care is \"too expensive\", and the cost of office visits, labs, etc. He states he gets all of his prescriptions from his PCP, including his oxygen. He does state he has lost some weight due to low appetite in the past couple of weeks since being ill. He reports no changes in his urination or bowel pattern.  He states he does urinate avbout every hour from his water pill, but there have been no significant changes in the stream, no dysuria, burning, pain or hesitancy noted. He denies fever, chills, light headedness, dizziness, chest pain, nausea, vomiting, diarrhea. He reports shortness of breath with exertion, fatigue, generalized weakness, low appetite. 24 hour summary:  The patient was relaxing in bed undergoing insertion of PICC. He feels improved and is breathing easier. He has to push to empty his bladder. Past Medical History:  Past Medical History:   Diagnosis Date    [facility]     CAD (coronary artery disease)     Cancer ([facility]     skin    COPD (chronic obstructive pulmonary disease) (HCC)     uses 2 liters oxygen continuously    History of blood transfusion 2015    following surgery on scalp    Hx of blood clots 2006    after aorta surgery    --   in leg, right? ?  stent inserted    Hypertension     MI (myocardial infarction) 1980's    x2       Past Surgical History:  Past Surgical History:   Procedure Laterality Date    AORTA SURGERY  2006??    BIOPSY EXTERNAL EAR Left 8/16/2017    EXCISION SQUAMOUS CELL CARCINOMA OF THE LEFT EAR HELIX WITH FROZEN SECTION  AND SHAVE OF SUSPICIOUS NONHEALING SQUAMOUS CELL RIGHT FOREARM performed by Myles Jimenez MD at [place] [address]  2006    Aorta Surgery    CARDIAC SURGERY  2006    Stent x1    EXCISION / BIOPSY SKIN LESION OF ARM Right 12/11/2017    EXCISION SCC RIGHT FOREARM WITH FLAP performed by Myles Jimenez MD at  [facility]  2004???    bilateral cataract    HEMORRHOID SURGERY  ???    x2    HERNIA REPAIR  1980's???    x2 - right??    OTHER SURGICAL HISTORY  12/05/2016    Excision of squamous cell carcinoma of right lateral chest, and biopsy of right forearm     OTHER SURGICAL HISTORY Left 02/20/2017    Excision SCC zygomatic arch with skin graft (K-DUR) 10 MEQ tablet Take 10 mEq by mouth every other day. Yes Historical Provider, MD   metoprolol (LOPRESSOR) 25 MG tablet Take 25 mg by mouth 2 times daily. Yes Historical Provider, MD   cloNIDine (CATAPRES) 0.2 MG tablet Take 0.2 mg by mouth 2 times daily. Yes Historical Provider, MD   albuterol (PROVENTIL HFA;VENTOLIN HFA) 108 (90 BASE) MCG/ACT inhaler Inhale 2 puffs into the lungs every 6 hours as needed. Yes Historical Provider, MD   Multiple Vitamins-Minerals (MULTIVITAMIN PO) Take 1 tablet by mouth daily. Last dose 3- for surgery 3-   Yes Historical Provider, MD   OXYGEN Inhale 2 L into the lungs continuous     Historical Provider, MD       Review of Systems:  Source of data: Patient  Constitutional: Denies fever, chills, Reports malaise, fatigue. HEENT: Denies visual changes, runny nose, sore throat, dysphagia, [place], tinnitus. Chest: Reports SOB, sputum production, cough. Cardiovascular: Denies palpitations, chest pain or pressure. GI: Denies abdominal pain, nausea, vomiting, diarrhea, constipation. : Denies hematuria, foamy urine, hesitancy, or painful urination. Skin: Denies wounds, rashes, redness. Musculoskeletal: Denies pain, swelling, tingling/numbness, weakness. Neurological: Denies dizziness, light headedness, syncope, confusion, numbness, tingling, gait disturbance.      Current Meds:  Infusion:    bacitracin      sodium chloride 50 mL/hr at 12/20/17 3984     Meds:    tamsulosin  0.8 mg Oral Daily    vancomycin  1 g Intravenous Q36H    darbepoetin gal-polysorbate  100 mcg Subcutaneous Weekly    vitamin C  250 mg Oral Daily    mometasone-formoterol  2 puff Inhalation BID    cloNIDine  0.2 mg Oral BID    furosemide  40 mg Oral Daily    metoprolol tartrate  25 mg Oral BID    multivitamin  1 tablet Oral Daily    pantoprazole  40 mg Oral Daily    potassium chloride  10 mEq Oral Every Other Day    predniSONE  5 mg Oral Daily    vitamin A  10,000 there is clinical concern for osteomyelitis, further characterization could be obtained with MRI. 2. Multiple anterior wedge compression deformities are demonstrated involving the T3-T10 levels. There is also mild anterior wedge compression deformity at T12. The anterior wedge compression deformities of T5 and T6 demonstrate a cortical step-off along    the superior endplates suggesting possible acute on chronic fracture. Correlate clinically for point tenderness at these locations. 3. Severe bilateral emphysematous changes noted. 4. Small bilateral pleural effusions are demonstrated. 12/19/17 thoracic spine xray  Impression   1. Multiple anterior wedge compression deformities involving multiple thoracic levels from T4 through T10. These are of indeterminate age. There is resultant accentuated thoracic kyphosis. No definite erosive changes are identified. However, please refer    to thoracic spine from same day for further description. 12/19/17 Chest xray   Impression   1. Lungs are hyperinflated and fibroemphysematous in appearance. Heart within normal limits for size. 2. Small bladder pleural effusions versus chronic pleural thickening. Mildly increased thoracic kyphosis. Osteoporosis. Multiple compression fractures in the mid and upper thoracic spine, likely chronic. 3. Mild hazy parenchymal densities right perihilar region and both lung bases which could represent fibrosis versus atelectasis/pneumonia. 4. Progress imaging recommended. Component Results     Component Collected Lab   Gram Stain Result 12/19/2017  6:20  [facility]   Rare segmented neutrophils observed. No epithelial cells observed. Many gram positive cocci occurring singly and in pairs.      Organism  (Abnormal) 12/19/2017  6:20  [facility]   Staphylococcus (coagulase positive)     Aerobic Culture 12/19/2017  6:20  [facility]   heavy growth    Testing Performed and discussed with the patient, spouse and RN caring for the pt. Thank you for the consult. Please feel free to call me if you have any questions.      Keith Krabbe, DO  [facility]

## 2017-12-21 NOTE — PROGRESS NOTES
Spoke with patient and wife as well as son and daughter-in-law explained TCU philosophy. Welcome letter provided. Updated  [facility] RN of conversation.

## 2017-12-21 NOTE — PLAN OF CARE
Problem: Falls - Risk of  Goal: Absence of falls  Outcome: Ongoing  Patient absent of falls this shift. Patient assisted with ambulation. Patients call light within reach, fall band on, non-skid socks, fall sign posted on door, and patient educated to not get up per self to reduce the risk of falls. Bed and chair alarms being used. Problem: Risk for Impaired Skin Integrity  Goal: Tissue integrity - skin and mucous membranes  Structural intactness and normal physiological function of skin and  mucous membranes. Outcome: Ongoing  Monitoring patient for skin breakdown. Patient has open wound on mid back, dressing applied per doctor. Redness noted on sacrum, preventative dressing applied. Patient educated on need to reposition self in bed to reduce risk for skin breakdown. Skin assessed frequently with assessments. Problem: Discharge Planning:  Goal: Discharged to appropriate level of care  Discharged to appropriate level of care  Outcome: Ongoing  Patient plans to go to TCU at discharge. Patient has need for antibiotic therapy and wound care at discharge. Problem: Pressure Ulcer:  Goal: Signs of wound healing will improve  Signs of wound healing will improve  Outcome: Ongoing  Wound on mid back, dressing clean, dry and intact. RN monitoring for any signs of infection. Comments: Care plan reviewed with patient. Patient verbalize understanding of the plan of care and contribute to goal setting.

## 2017-12-21 NOTE — FLOWSHEET NOTE
Pt was anointed     12/20/17 1957   Encounter Summary   Services provided to: Patient and family together   Referral/Consult From: [facility] Visiting Yes  (12/20)   Complexity of Encounter Moderate   Length of Encounter 30 minutes   Routine   Type Initial   Spiritual/Confucianism   Type Spiritual support   Assessment Approachable;Calm   Intervention Prayer;Nurtured hope   Outcome Connection/belonging;Expressed gratitude;Encouraged; Hopeful;Receptive   Sacraments   Sacrament of Sick-Anointing Anointed

## 2017-12-21 NOTE — PROGRESS NOTES
Spoke with Dr. Hernandez Dove regarding IV antibiotic plan states he is planning 2-3 weeks antibiotics. Will follow clinical course including PT/OT progress to further determine patient needs.

## 2017-12-21 NOTE — PROGRESS NOTES
Infectious Diseases Progress Note  Pt Name: Denny Espino  MRN: [id number]  YOB: 1933  Admit Date: 12/19/2017  1:11 PM  Date of evaluation: 12/21/2017  Primary Care Physician: Breanna Elliott MD   [id number] 80-year-old male with history of problems with his thoracic scoliosis and chronic problems with  breathing and he does sit for prolonged times in his recliner and he has developed ulcer in the thoracic spine that started in 07/2017. The patient was seen and evaluated in the past by Dr. Neva Adams and he is referred for further evaluation and initially seen in our office on 09/25/2017. The patient also appears to have improved initially, but after a few visits over the last month, his ulcer continued to worse. The patient unfortunately sits and reclines right over the ulcer site and his scoliosis essentially is not helping it. The patient had cultures done on 12/08/2017 and that showed MRSA and the patient presented to the office with continued worsening of the cellulitis, so decision was made to admit him to the hospital for further evaluation and treatment. The patient has been noted to have problems with his elevated creatinine and also is not noted to have significant problems with anemia with hemoglobin of 6.2.   Given 2 units of PRBCs. Subjective: Interval History:  Patient in bed and wound examined with ostomy staff. Pt/Chart review last 24 hours:  Fever No, Diarrhea No, Dyspnea No,     Objective:      Vitals: BP (!) 144/77   Pulse 81   Temp 97.8 °F (36.6 °C) (Oral)   Resp 18   Ht 5' 8\" (1.727 m)   Wt 138 lb 9.6 oz (62.9 kg)   SpO2 94%   BMI 21.07 kg/m²    HEENT: Head: Normal, normocephalic, atraumatic.   Heart: regular rate and rhythm  Lungs: clear to auscultation bilaterally  Abdomen: soft, non-tender; bowel sounds normal; no masses,  no organomegaly  Extremities: edema lower extremities with pitting 2+  Neurologic: Mental status: Alert, oriented, thought content appropriate   Wound/Ulcers: ulcer noted with slough     Data:   Current ATBs: vanco 12/19     Scheduled Meds:   vancomycin  1 g Intravenous Q36H    darbepoetin gal-polysorbate  100 mcg Subcutaneous Weekly    vitamin C  250 mg Oral Daily    mometasone-formoterol  2 puff Inhalation BID    cloNIDine  0.2 mg Oral BID    furosemide  40 mg Oral Daily    metoprolol tartrate  25 mg Oral BID    multivitamin  1 tablet Oral Daily    pantoprazole  40 mg Oral Daily    potassium chloride  10 mEq Oral Every Other Day    predniSONE  5 mg Oral Daily    vitamin A  10,000 Units Oral Daily    sodium chloride flush  10 mL Intravenous 2 times per day    lidocaine 1 % injection  5 mL Intradermal Once    pneumococcal 13-valent conjugate  0.5 mL Intramuscular Once    influenza virus vaccine  0.5 mL Intramuscular Once    vancomycin (VANCOCIN) intermittent dosing (placeholder)   Other RX Placeholder    folic acid  1 mg Oral Daily     Continuous Infusions:   bacitracin      sodium chloride 50 mL/hr at 12/20/17 2143       I/O last 3 completed shifts: In: 2479 [P.O.:755; I.V.:1414; Blood:310]  Out: 2075 [Urine:2075]  No intake/output data recorded.     Intake/Output Summary (Last 24 hours) at 12/21/17 0904  Last data filed at 12/21/17 0411   Gross per 24 hour   Intake          2168.97 ml   Output             2075 ml   Net            93.97 ml     CBC:   Recent Labs      12/19/17   1446  12/20/17   0214  12/20/17   0919  12/21/17   0537   WBC  5.6  4.2*   --   4.8   HGB  6.2*  6.6*  9.0*  7.4*   HCT  18.6*  19.8*   --   23.0*   PLT  183  181   --   161     BMP:    Recent Labs      12/19/17   1446  12/20/17   0214  12/21/17   0537   NA  141  139  140   K  4.3  4.1  4.5   CL  96*  97*  99   CO2  37*  36*  32   BUN  38*  42*  41*   CREATININE  1.9*  1.9*  1.8*   GLUCOSE  109*  105  90     Hepatic:   Recent Labs      12/19/17   1446  12/20/17   0214  12/21/17   0537   AST  17  15  24   ALT  9*  9*  17   BILITOT  0.2*  0.3  0.3 ALKPHOS  64  65  63     Ferritin 22 - 322 ng/mL 473       Iron 65 - 195 ug/dL 54     Comments: Performed at [facility], [address]       Urine:       MICRO:    Specimen: Wound Updated: 12/20/17 1008    Gram Stain Result Rare segmented neutrophils observed. No epithelial cells observed. Many gram positive cocci occurring singly and in pairs. Narrative:         Lab Results   Component Value Date    BC No growth-preliminary 12/19/2017       IMAGING per radiologist interpretation: CT spine    Impression:       1. There is soft tissue swelling overlying the spinous processes of the thoracic spine. Specifically, the area of soft tissue swelling overlying the spinous processes between the scapulae, in the area of reported concern, demonstrate no definite evidence   of osseous erosion. If there is clinical concern for osteomyelitis, further characterization could be obtained with MRI. 2. Multiple anterior wedge compression deformities are demonstrated involving the T3-T10 levels. There is also mild anterior wedge compression deformity at T12. The anterior wedge compression deformities of T5 and T6 demonstrate a cortical step-off along   the superior endplates suggesting possible acute on chronic fracture. Correlate clinically for point tenderness at these locations. 3. Severe bilateral emphysematous changes noted. 4. Small bilateral pleural effusions are demonstrated. Impression:       1. Multiple anterior wedge compression deformities involving multiple thoracic levels from T4 through T10. These are of indeterminate age. There is resultant accentuated thoracic kyphosis. No definite erosive changes are identified. However, please refer   to thoracic spine from same day for further description. Assessment    1. Thoracic spine/upper back cellulitis. 2.  Unstageable thoracic ulcer with pressure ulcer to the back that is  worsening.   3.  Cellulitis of the skin

## 2017-12-21 NOTE — FLOWSHEET NOTE
12/21/17 1148   Provider Notification   Reason for Communication Review case   Provider Name Dr. Malka Yin   Provider Notification Physician   Method of Communication Secure Message   Response No new orders   Notification Time [phone] messaged back stating to plan for TCU tomorrow.

## 2017-12-21 NOTE — PLAN OF CARE
Problem: Falls - Risk of  Goal: Absence of falls  Outcome: Ongoing  Patient free of falls this shift. Patient in bed, bed in lowest position, bed alarm on. Patient up with assistance only. Patient call light in reach. Problem: Risk for Impaired Skin Integrity  Goal: Tissue integrity - skin and mucous membranes  Structural intactness and normal physiological function of skin and  mucous membranes. Outcome: Ongoing      Comments: Care plan reviewed with patient. Patient verbalize understanding of the plan of care and contribute to goal setting.

## 2017-12-22 PROBLEM — L03.90 CELLULITIS: Status: ACTIVE | Noted: 2017-01-01

## 2017-12-22 NOTE — CONSULTS
weight is 178 pounds. His temperature is 97.9,  respirations 16, pulse 71, blood pressure 130/70. O2 saturation 92% on 2  liters of nasal cannula. HEENT:  Normocephalic and atraumatic. NECK:  Supple. No JVD. No bruit. No thyromegaly. No lymphadenopathy. CHEST:  Bilateral air entry was noted. No rhonchi. HEART:  S1, S2. No S3 or S4. No murmur. ABDOMEN:  Soft. No organomegaly. EXTREMITIES:  No clubbing. No cyanosis. No edema. LABORATORY DATA:  His BUN is 41, creatinine 1.8. His blood sugar is 90. Albumin 2.9, liver enzymes were normal.  CBC today, white blood cells 4.8,  hemoglobin 7.4 gm, hematocrit 23% and platelets 184. His ferritin is 473. IMPRESSION AND PLAN:  This is an 63-year-old gentleman with anemia. His  anemia could be related to his kidney disease. I am going to start him on  red cell growth factor, Aranesp, 100 mcg weekly. The patient also having  infection that could cause the anemia. The patient could have subacute GI  bleeding, but _____. I am going to order a CBC. I will arrange to see the  patient in my office in the future to continue his red cell growth factor. We thank you for the consult.         Manjinder Martinez M.D.    D: 12/21/2017 19:50:29       T: 12/21/2017 23:19:52     GLORIA_MARY ELLEN_ZEN  Job#: [id number]     Doc#: [id number]    CC:

## 2017-12-22 NOTE — PLAN OF CARE
Problem: Falls - Risk of  Goal: Absence of falls  Outcome: Ongoing  Patient a fall risk. Fall band intact, falling star magnet on door. Hourly visual rounding completed and bed alarm activated. Problem: Risk for Impaired Skin Integrity  Goal: Tissue integrity - skin and mucous membranes  Structural intactness and normal physiological function of skin and  mucous membranes. Outcome: Ongoing  Patients skin assessed for signs of skin breakdown. Patient repositioned frequently. Heels elevated. Problem: Discharge Planning:  Goal: Discharged to appropriate level of care  Discharged to appropriate level of care   Outcome: Ongoing  Patient plans to return TCU at discharge. Problem: Pressure Ulcer:  Goal: Signs of wound healing will improve  Signs of wound healing will improve   Outcome: Ongoing  Wounds dressed patient denies pain. Wound vac to be started tomorrow    Problem: Infection - Central Venous Catheter-Associated Bloodstream Infection:  Goal: Will show no infection signs and symptoms  Will show no infection signs and symptoms   Outcome: Ongoing  Precautions followed throughout shift. Proper PPE used per protocol. Patient remained in isolation for shift. Comments: Care plan reviewed with patient. Patient verbalize understanding of the plan of care and contribute to goal setting.

## 2017-12-22 NOTE — CONSULTS
Inpatient Consultation    Pardeep Chaudhary (7/29/1933)  12/22/2017    Reason for Consult:  Multiple thoracic compression fx  Requesting Physician: Dr. Nathaly Newton:  Wound infection    History Obtained From:  Patient and EMR    HISTORY OF PRESENT ILLNESS:                The patient is a 80 y.o. male who presents with above chief complaint. The patient was admitted on 12/19/17 to Dr. Daily Freire service for treatment of an ulcer along his thoracic spine. Cultures (+) MRSA and patient stated on IV antibiotics. Patient had been following with Dr. Mary Duran prior to admission. H/o CKD, Hgb 6.2 on admission. Patient recently underwent excision of squamous cell carcinoma with Dr. Baldomero Gee. CT of the thoracic spine showed multiple compression fractures and kyphosis. No evidence of osseous erosion. We were consulted for management of the fractures. Patient reports some pain at the ulcer site, denies thoracic pain elsewhere. Per patient he has had the kyphotic posture for over 5 years. Patient reports it is more comfortable to sit up in his recliner due to his posture which led to formation of his ulcer. Denies radicular symptoms. Patient is able to ambulate but reports he gets short of breath with ambulating. Denies bowel or bladder incontinence or trev weakness into the extremities. Patient had debridement done with Dr. Mary Duran on 12/20 and plans for a wound vac possible today. Past Medical History:        Diagnosis Date    AAA (abdominal aortic aneurysm) ([facility] [address]     CAD (coronary artery disease)     Cancer (HCC)     skin    COPD (chronic obstructive pulmonary disease) (HCC)     uses 2 liters oxygen continuously    History of blood transfusion 2015    following surgery on scalp    Hx of blood clots 2006    after aorta surgery    --   in leg, right? ?  stent inserted    Hypertension     MI (myocardial infarction) 18's    x2     Past Surgical History:        Procedure Laterality Date    AORTA SURGERY  2006??    BIOPSY EXTERNAL EAR Left 8/16/2017    EXCISION SQUAMOUS CELL CARCINOMA OF THE LEFT EAR HELIX WITH FROZEN SECTION  AND SHAVE OF SUSPICIOUS NONHEALING SQUAMOUS CELL RIGHT FOREARM performed by Beth Sims MD at [facility]  [address]  [facility]  2006    Aorta Surgery    CARDIAC SURGERY  2006    Stent x1    EXCISION / BIOPSY SKIN LESION OF ARM Right 12/11/2017    EXCISION SCC RIGHT FOREARM WITH FLAP performed by Beth Sims MD at  [address]  2004???    bilateral cataract    HEMORRHOID SURGERY  ???    x2    HERNIA REPAIR  1980's???    x2 - right??    OTHER SURGICAL HISTORY  12/05/2016    Excision of squamous cell carcinoma of right lateral chest, and biopsy of right forearm     OTHER SURGICAL HISTORY Left 02/20/2017    Excision SCC zygomatic arch with skin graft    OTHER SURGICAL HISTORY Left 08/16/2017    Excision SCC of left ear helix with frozen section    OTHER SURGICAL HISTORY Right 12/11/2017    Excision squamous cell carcinoma right forearm with flap and frozen section    PRE-MALIGNANT / BENIGN SKIN LESION EXCISION  3/27/2015    BCC Scalp    SKIN BIOPSY      SKIN CANCER EXCISION Left 10/31/2014    squamous cell left ear x 2  with full thickness skin graft and frozen section    TONSILLECTOMY AND ADENOIDECTOMY      as a child     Current Medications:   Current Facility-Administered Medications: tamsulosin (FLOMAX) capsule 0.8 mg, 0.8 mg, Oral, Daily  bacitracin ointment, , Topical, Continuous PRN  vancomycin (VANCOCIN) 1 g in sodium chloride 0.9% 200 mL IVPB, 1 g, Intravenous, Q36H  lidocaine (XYLOCAINE) 2 % jelly, , Topical, PRN  darbepoetin gal-polysorbate (ARANESP) injection 100 mcg, 100 mcg, Subcutaneous, Weekly  albuterol sulfate  (90 Base) MCG/ACT inhaler 2 puff, 2 puff, Inhalation, Q6H PRN  vitamin C (ASCORBIC ACID) tablet 250 mg, 250 mg, Oral, Daily  mometasone-formoterol (DULERA) chills  RESPIRATORY: positive for shortness of breath with exertion. negative cough  CARDIOVASCULAR:  negative for chest pain and palpitations  GASTROINTESTINAL:  negative for nausea, vomiting, bowel incontinence  GENITOURINARY:  negative for frequency and urinary incontinence  MUSCULOSKELETAL:  positive for thoracic pain at wound site. negative radicular sx  NEUROLOGICAL:  negative for headaches, syncope  BEHAVIOR/PSYCH:  negative for depressed mood, anxiety    PHYSICAL EXAM:      CONSTITUTIONAL:  awake, alert, cooperative, no apparent distress, and patient resting in bed, kyphotic   HEAD: atraumatic, normocephalic  LUNGS:  No respiratory distress. CTA bilaterally per H&P  CARDIOVASCULAR:  RRR, no murmur per H&P  ABDOMEN:  Soft, non-distended, non-tender  SPINE: midline ulcer over the thoracic spine, covered with dressing at this time. Patient non-tender to palpation along the spine above or below ulcer. MUSCULOSKELETAL:  There is no redness, warmth, or swelling of the joints. Motor strength is 5 out of 5 bilateral lower extremities. NEUROLOGIC:  Awake, alert, oriented to name, place and time. Sensory intact.     DATA:    CBC:   Lab Results   Component Value Date    WBC 4.5 12/22/2017    RBC 2.20 12/22/2017    HGB 7.3 12/22/2017    HCT 21.3 12/22/2017    MCV 96.8 12/22/2017    MCH 33.1 12/22/2017    MCHC 34.2 12/22/2017    RDW 26.6 12/22/2017     12/22/2017    MPV 9.1 12/22/2017     WBC:    Lab Results   Component Value Date    WBC 4.5 12/22/2017     Hemoglobin/Hematocrit:    Lab Results   Component Value Date    HGB 7.3 12/22/2017    HCT 21.3 12/22/2017     CMP:    Lab Results   Component Value Date     12/22/2017    K 4.7 12/22/2017    CL 99 12/22/2017    CO2 32 12/22/2017    BUN 35 12/22/2017    CREATININE 1.5 12/22/2017    LABGLOM 45 12/22/2017    GLUCOSE 116 12/22/2017    PROT 5.6 12/22/2017    LABALBU 2.7 12/22/2017    CALCIUM 8.3 12/22/2017    BILITOT 0.3 12/22/2017    ALKPHOS 55 12/22/2017

## 2017-12-22 NOTE — PROGRESS NOTES
In to see patient at request of Dr. Hernandez Dove to apply wound vac dressing to the lumbar wound. Patient states Dr. Hernandez Dove had debrided wound yesterday. Wound still with slough but does have some red tissue noted. See photo. Patient with peticheal bruising in the darren area and skin tear from previous tape dressing noted distally. See flow sheet from 9:30 today. Wound cleansed with saline. Skin prep and stoma wafer to the darren wound. Wound vac black sponge cut to fit wound bed and bridged to the upper right back per Dr. Hernandez Dove instruction. Wound vac secured with clear vac drape and vac at 125mmhg cont suction. Patient tolerated well. Orders to change again on Tuesday. Patient is using the off loading wedge cushion to his back.

## 2017-12-22 NOTE — FLOWSHEET NOTE
12/22/17 1027   Provider Notification   Reason for Communication Review case   Provider Name Dr. Ellie Bagley   Provider Notification Physician   Method of Communication Secure Message   Response Waiting for response   Notification Time [phone]     Sent a message to Dr. Ellie Bagley to let him know that TCU had a bed available for the patient and that the wound vac is on. Waiting for response at this time.

## 2017-12-22 NOTE — PLAN OF CARE
Problem: Risk for Impaired Skin Integrity  Goal: Tissue integrity - skin and mucous membranes  Structural intactness and normal physiological function of skin and  mucous membranes. Outcome: Ongoing  SPR mattress in place. Patient able to turn self. Wedge in place to help keep pressure off wound vac on back. Problem: Discharge Planning:  Goal: Discharged to appropriate level of care  Discharged to appropriate level of care   Outcome: Ongoing  Patient plans to go to TCU today for further care. Problem: Pressure Ulcer:  Goal: Signs of wound healing will improve  Signs of wound healing will improve   Outcome: Ongoing  Wound vac in place. SPR mattress in place. Wounds are covered. Denies pain. Problem: Infection - Central Venous Catheter-Associated Bloodstream Infection:  Goal: Will show no infection signs and symptoms  Will show no infection signs and symptoms   Outcome: Ongoing  No signs and symptoms noted. Will continue to monitor. Comments: Care plan reviewed with patient. Patient verbalizes understanding of the plan of care and contribute to goal setting.

## 2017-12-22 NOTE — DISCHARGE SUMMARY
ID Physician Discharge Summary   Patient ID:  Katey Houser  [id number]  32 y.o.  7/29/1933  [id number]     Admit date: 12/19/2017    Discharge date and time: No discharge date for patient encounter. Admitting Physician: Ras Dash MD     Discharge Diagnoses:   1.  Thoracic spine/upper back cellulitis. 2.  Unstageable thoracic ulcer with pressure ulcer to the back that is  worsening. 3.  Cellulitis of the skin of the back, thoracic region. 4.  MRSA infection. 5.  Chronic kidney disease. 6.  Severe anemia with hemoglobin 6.2.  7.  Coronary artery disease. 8.  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 9.  Coccyx area unstageable ulcer present on admission in association with moisture-related skin damage. 10. Chronic respiratory failure with hypoxia in the setting of COPD, being treated with chronic continuous O2   11. Wedge compression fracture of T5 & T6   [id number] systolic and/or diastolic CHF in the setting of CKD & HTN, being treated with oral Lasix, Lopressor, home O2,       Discharged Condition: stable    Hospital Course: Please see History and Physical for details on patient's presentation and problems. 59-year-old male with history of problems with his thoracic scoliosis and chronic problems with  breathing and he does sit for prolonged times in his recliner and he has developed ulcer in the thoracic spine that started in 07/2017.  The patient was seen and evaluated in the past by Dr. Brit Do and he is referred for further evaluation and initially seen in our office on 09/25/2017.  The patient also appears to have improved initially, but after a few visits over the last month, his ulcer continued to worse.   The patient unfortunately sits and reclines right over the ulcer site and his scoliosis essentially is not helping it.  The patient had cultures done on 12/08/2017 and that showed MRSA and the patient presented to the office with continued worsening of the

## 2017-12-22 NOTE — PLAN OF CARE
Problem: Nutrition  Goal: Optimal nutrition therapy  Outcome: Ongoing  Nutrition Problem:  Moderate malnutrition, in context of chronic illness  Intervention: Food and/or Nutrient Delivery: Modify current diet, Start ONS, Vitamin Supplement  Nutritional Goals: Pt will consume 75% or more of meals during LOS to aid in woundh healing and weight maintenance

## 2017-12-22 NOTE — CARE COORDINATION
12/22/17, 9:54 AM    Discharge plan discussed by  and . Discharge plan reviewed with patient/ family. Patient/ family verbalize understanding of discharge plan and are in agreement with plan. Understanding was demonstrated using the teach back method. IMM Letter  IMM Letter date given[de-identified] 12/22/17  IMM Letter time given[de-identified] 1085       Discharge planned for today and admission to TCU. IMM updated. No needs voiced.

## 2017-12-22 NOTE — PROGRESS NOTES
memory intact. Pt is attentive. Thought is coherant. SKIN: no rash, No significant bruises. Wound not assessed  MUSCULOSKELETAL: Movement is coordinated. Moves all extremities   EXTREMITIES: Distal lower extremity temp is warm,No lower extremity edema. PSYCHIATRIC: mood and affect appropriate.     Medications:   Scheduled Meds:   tamsulosin  0.8 mg Oral Daily    vancomycin  1 g Intravenous Q36H    darbepoetin gal-polysorbate  100 mcg Subcutaneous Weekly    vitamin C  250 mg Oral Daily    mometasone-formoterol  2 puff Inhalation BID    cloNIDine  0.2 mg Oral BID    furosemide  40 mg Oral Daily    metoprolol tartrate  25 mg Oral BID    multivitamin  1 tablet Oral Daily    pantoprazole  40 mg Oral Daily    potassium chloride  10 mEq Oral Every Other Day    predniSONE  5 mg Oral Daily    vitamin A  10,000 Units Oral Daily    sodium chloride flush  10 mL Intravenous 2 times per day    lidocaine 1 % injection  5 mL Intradermal Once    pneumococcal 13-valent conjugate  0.5 mL Intramuscular Once    influenza virus vaccine  0.5 mL Intramuscular Once    vancomycin (VANCOCIN) intermittent dosing (placeholder)   Other RX Placeholder    folic acid  1 mg Oral Daily     Continuous Infusions:   bacitracin      sodium chloride 50 mL/hr at 12/20/17 2143       Labs:     Recent Labs      12/20/17   0214  12/21/17   0537  12/22/17   0554   NA  139  140  140   K  4.1  4.5  4.7   CL  97*  99  99   CO2  36*  32  32   BUN  42*  41*  35*   CREATININE  1.9*  1.8*  1.5*   LABGLOM  34*  36*  45*   GLUCOSE  105  90  116*   CALCIUM  8.6  8.6  8.3*     Recent Labs      12/20/17   0214  12/20/17   0919  12/21/17   0537  12/22/17   0554   WBC  4.2*   --   4.8  4.5*   RBC  1.98*   --   2.35*  2.20*   HGB  6.6*  9.0*  7.4*  7.3*   HCT  19.8*   --   23.0*  21.3*   PLT  181   --   161  134      Lab Results   Component Value Date    INR 0.99 03/31/2015     Kidney US   The appearance of the right and left renal cortex suggests at

## 2017-12-23 PROBLEM — I82.612 SUPERFICIAL VENOUS THROMBOSIS OF LEFT ARM: Status: ACTIVE | Noted: 2017-01-01

## 2017-12-23 PROBLEM — I50.9 CHRONIC CHF (CONGESTIVE HEART FAILURE) (HCC): Status: ACTIVE | Noted: 2017-01-01

## 2017-12-23 PROBLEM — E87.5 HYPERKALEMIA: Status: ACTIVE | Noted: 2017-01-01

## 2017-12-23 PROBLEM — Z22.322 MRSA (METHICILLIN RESISTANT STAPH AUREUS) CULTURE POSITIVE: Status: ACTIVE | Noted: 2017-01-01

## 2017-12-23 PROBLEM — E55.9 VITAMIN D DEFICIENCY: Status: ACTIVE | Noted: 2017-01-01

## 2017-12-23 PROBLEM — J96.10 CHRONIC RESPIRATORY FAILURE (HCC): Status: ACTIVE | Noted: 2017-01-01

## 2017-12-23 NOTE — PROGRESS NOTES
1008    Gram Stain Result Rare segmented neutrophils observed. No epithelial cells observed. Many gram positive cocci occurring singly and in pairs. Narrative:         Lab Results   Component Value Date    BC No growth-preliminary 12/19/2017       IMAGING per radiologist interpretation: CT spine    Impression:       1. There is soft tissue swelling overlying the spinous processes of the thoracic spine. Specifically, the area of soft tissue swelling overlying the spinous processes between the scapulae, in the area of reported concern, demonstrate no definite evidence   of osseous erosion. If there is clinical concern for osteomyelitis, further characterization could be obtained with MRI. 2. Multiple anterior wedge compression deformities are demonstrated involving the T3-T10 levels. There is also mild anterior wedge compression deformity at T12. The anterior wedge compression deformities of T5 and T6 demonstrate a cortical step-off along   the superior endplates suggesting possible acute on chronic fracture. Correlate clinically for point tenderness at these locations. 3. Severe bilateral emphysematous changes noted. 4. Small bilateral pleural effusions are demonstrated. Impression:       1. Multiple anterior wedge compression deformities involving multiple thoracic levels from T4 through T10. These are of indeterminate age. There is resultant accentuated thoracic kyphosis. No definite erosive changes are identified. However, please refer   to thoracic spine from same day for further description. Assessment    1. Thoracic spine/upper back cellulitis. 2.  Unstageable thoracic ulcer with pressure ulcer to the back that is  worsening. 3.  Cellulitis of the skin of the back, thoracic region. 4. MRSA infection. 5.  Chronic kidney disease. 6.  Severe anemia with hemoglobin 6.2.  7.  Coronary artery disease.   8.  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  9.

## 2017-12-23 NOTE — PROGRESS NOTES
Pharmacy Vancomycin Consult     Vancomycin Day: 5  Current Dosin,000 mg IV q36h    Temp max:  97.7    Recent Labs      17   0554  17   0545   BUN  35*  37*       Recent Labs      17   0554  17   0545   CREATININE  1.5*  1.7*       Recent Labs      17   0537  17   0554   WBC  4.8  4.5*         Intake/Output Summary (Last 24 hours) at 17 0751  Last data filed at 17 0163   Gross per 24 hour   Intake 0 ml   Output 925 ml   Net -925 ml     Cultures/Sensitivities:  Date Source Result   17 wound MRSA per Dr Walker Kelsey office visit culture   17 Back wound Many GPC singly and pairs   17 Blood X 2  No growth- prelim                Ht Readings from Last 1 Encounters:   17 5' 8\" (1.727 m)        Wt Readings from Last 1 Encounters:   17 147 lb 14.4 oz (67.1 kg)         Body mass index is 22.49 kg/m². Estimated Creatinine Clearance: 31 mL/min (based on SCr of 1.7 mg/dL). Trough: 9.4    Assessment/Plan:  Trough drawn prior to the 3rd dose of regimen. Continue current dose of vancomycin. Consider rechecking trough earlier if renal function changes.      Ole Sheffield, PharmD, BCPS   2017  7:54 AM

## 2017-12-23 NOTE — PROGRESS NOTES
08/16/2017    Excision SCC of left ear helix with frozen section    OTHER SURGICAL HISTORY Right 12/11/2017    Excision squamous cell carcinoma right forearm with flap and frozen section    PRE-MALIGNANT / BENIGN SKIN LESION EXCISION  3/27/2015    BCC Scalp    SKIN BIOPSY      SKIN CANCER EXCISION Left 10/31/2014    squamous cell left ear x 2  with full thickness skin graft and frozen section    TONSILLECTOMY AND ADENOIDECTOMY      as a child       Restrictions/Precautions:  Restrictions/Precautions: General Precautions, Contact Precautions, Isolation                      Subjective:  Chart Reviewed: Yes  Patient assessed for rehabilitation services?: Yes  Family / Caregiver Present: No  Subjective: patient in bed on arrival, pleasant and agrees to evaluation. patient reports throughout sessino that he is at baseline in regards to mobility. Small open area on inferior edge of wound on back noted to be open to air, nursing informed    General:  Overall Orientation Status: Within Normal Limits  Follows Commands: Within Functional Limits    Vision: Within Functional Limits    Hearing: Exceptions to [facility]  Hearing Exceptions: Hard of hearing/hearing concerns         Pain:  Denies. Social/Functional History:    Lives With: Spouse  Type of Home: House  Home Layout: One level  Home Access: Stairs to enter without rails  Entrance Stairs - Number of Steps: 1  Home Equipment: Rolling walker (has 4 walkers, keeps one inside, one in each car, and one in garage)             ADL Assistance: [address]: Independent  Transfer Assistance: Independent    Active :  Yes  Additional Comments: kids and grandkids live nearby to assist prn , pt assist with groceries, wife primary for tasks     Objective:       RLE AROM: WFL         LLE AROM : WFL                 Strength RLE: WFL  Comment: gross 4+/5, hip flexion 4/5    Strength LLE: WFL  Comment: gross 4+/5, hip

## 2017-12-23 NOTE — PROGRESS NOTES
FOREARM WITH FLAP performed by Eleni Regalado MD at [address]  2004???    bilateral cataract    HEMORRHOID SURGERY  ???    x2    HERNIA REPAIR  1980's???    x2 - right??    OTHER SURGICAL HISTORY  12/05/2016    Excision of squamous cell carcinoma of right lateral chest, and biopsy of right forearm     OTHER SURGICAL HISTORY Left 02/20/2017    Excision SCC zygomatic arch with skin graft    OTHER SURGICAL HISTORY Left 08/16/2017    Excision SCC of left ear helix with frozen section    OTHER SURGICAL HISTORY Right 12/11/2017    Excision squamous cell carcinoma right forearm with flap and frozen section    PRE-MALIGNANT / BENIGN SKIN LESION EXCISION  3/27/2015    BCC Scalp    SKIN BIOPSY      SKIN CANCER EXCISION Left 10/31/2014    squamous cell left ear x 2  with full thickness skin graft and frozen section    TONSILLECTOMY AND ADENOIDECTOMY      as a child           Subjective  Chart Reviewed: Yes (reviewed H and P, physician notes, RN notes, )  Patient assessed for rehabilitation services?: Yes  Family / Caregiver Present: Yes    Subjective: Spouse present and supportive. OTR reviewed purpose of OT and skilled services which are provided. Spouse reluctant for education regarding managing  muliple lines in the room as pt asking if he is able to get up on his own. Reviewd having Supervision to manage lines. Pt stating he was at baseline. Spouse reports that pt has been more immobile lately. They used to get out and spend day trips in various locations. She would like him to push himself to increase endurance and strength. Pt in agreement that he wants to increase mobility.      General:       Vision: Within Functional Limits    Hearing: Within functional limits  Hearing Exceptions: Hard of hearing/hearing concerns       Pain:  Pain Assessment  Patient Currently in Pain: Denies       Social/Functional:  Lives With: Spouse  Type of Home: House  Home Layout: One level  Home Access: for strengthening ex to increase endurance and UE strength for completing home mgt task     Evaluation Complexity: Based on the findings of patient history, examination, clinical presentation, and decision making during this evaluation, this patient is of medium complexity.

## 2017-12-23 NOTE — H&P
TCU History and Physical      Chief Complaint and Reason for TCU Admission:   Need for continuation of care of the infected open areas of the back and intensive PT and OT for his deconditioned state    History of Present Illness:  Taylor Franks  is a 80 y.o. male admitted to the transitional care unit on 12/22/2017. He has been cared for by Dr Nenita Patel for the open infected areas of the lower thoracic spinous processes. He now has a wound vac in place of it. There is a cushion with a cut out area for the area to rest into when he sits back. He also has been having several skin cancers removed by Dr Preston Leo, most recently to the right forearm. He is debilitated and unable to care for himself so her requires intensive time with PT and OT prior to disposition. Past Medical History:      Diagnosis Date    [facility]ab[facility]     CAD (coronary artery disease)     Cancer (HCC)     skin    Chronic CHF (congestive heart failure) ([facility] 12/23/2017    COPD (chronic obstructive pulmonary disease) (HCC)     uses 2 liters oxygen continuously    History of blood transfusion 2015    following surgery on scalp    Hx of blood clots 2006    after aorta surgery    --   in leg, right? ?  stent inserted    Hypertension     MI (myocardial infarction) 18's    x2       Primary care provider: Jarvis Perez MD     Past Surgical History:      Procedure Laterality Date    AORTA SURGERY  2006??    BIOPSY EXTERNAL EAR Left 8/16/2017    EXCISION SQUAMOUS CELL CARCINOMA OF THE LEFT EAR HELIX WITH FROZEN SECTION  AND SHAVE OF SUSPICIOUS NONHEALING SQUAMOUS CELL RIGHT FOREARM performed by Radha Fernandez MD at [facility]  [id number]    [address] [place] [address]  2006    Aorta Surgery    CARDIAC SURGERY  2006    Stent x1    EXCISION / BIOPSY SKIN LESION OF ARM Right 12/11/2017    EXCISION SCC RIGHT FOREARM WITH FLAP performed by Radha Fernandez MD at [address] [place] [address] EYE SURGERY  2004???    bilateral cataract    HEMORRHOID SURGERY  ???    x2    HERNIA REPAIR  1980's???    x2 - right??    OTHER SURGICAL HISTORY  12/05/2016    Excision of squamous cell carcinoma of right lateral chest, and biopsy of right forearm     OTHER SURGICAL HISTORY Left 02/20/2017    Excision SCC zygomatic arch with skin graft    OTHER SURGICAL HISTORY Left 08/16/2017    Excision SCC of left ear helix with frozen section    OTHER SURGICAL HISTORY Right 12/11/2017    Excision squamous cell carcinoma right forearm with flap and frozen section    PRE-MALIGNANT / BENIGN SKIN LESION EXCISION  3/27/2015    BCC Scalp    SKIN BIOPSY      SKIN CANCER EXCISION Left 10/31/2014    squamous cell left ear x 2  with full thickness skin graft and frozen section    TONSILLECTOMY AND ADENOIDECTOMY      as a child       Allergies:    Review of patient's allergies indicates no known allergies.     Current Medications:    Current Facility-Administered Medications: vitamin D (CHOLECALCIFEROL) tablet 5,000 Units, 5,000 Units, Oral, Daily  enoxaparin (LOVENOX) injection 40 mg, 40 mg, Subcutaneous, Daily  acetaminophen (TYLENOL) tablet 650 mg, 650 mg, Oral, Q4H PRN  magnesium hydroxide (MILK OF MAGNESIA) 400 MG/5ML suspension 30 mL, 30 mL, Oral, Daily PRN  ondansetron (ZOFRAN) injection 4 mg, 4 mg, Intravenous, Q6H PRN  sodium chloride flush 0.9 % injection 10 mL, 10 mL, Intravenous, 2 times per day  sodium chloride flush 0.9 % injection 10 mL, 10 mL, Intravenous, PRN  albuterol sulfate  (90 Base) MCG/ACT inhaler 2 puff, 2 puff, Inhalation, Q6H PRN  bacitracin ointment, , Topical, Continuous PRN  cloNIDine (CATAPRES) tablet 0.1 mg, 0.1 mg, Oral, BID  [START ON 12/27/2017] darbepoetin gal-polysorbate (ARANESP) injection 100 mcg, 100 mcg, Subcutaneous, Weekly  folic acid (FOLVITE) tablet 1 mg, 1 mg, Oral, Daily  furosemide (LASIX) tablet 40 mg, 40 mg, Oral, Daily  metoprolol tartrate (LOPRESSOR) tablet 25 mg, 25 mg, 5:45 AM   Result Value Ref Range    Est, Glom Filt Rate 39 (A) ml/min/1.73m2       Impression:  Active Hospital Problems    Diagnosis Date Noted    Hyperkalemia [E87.5] 12/23/2017    Vitamin D deficiency [E55.9] 12/23/2017    MRSA (methicillin resistant staph aureus) culture positive [Z22.322] 12/23/2017    Chronic respiratory failure (HCC) [J96.10] 12/23/2017    Chronic CHF (congestive heart failure) ([facility] [I50.9] 12/23/2017    Superficial venous thrombosis of left arm [I82.612] 12/23/2017    Cellulitis of back except buttock [L03.312] 12/19/2017    Acute kidney injury ([facility] [N17.9]    ·      Plan:   · The patient demonstrates good potential to participate in a skilled rehabilitation program on the transitional care unit. Rehabilitation services will include PT and OT/RT in order to improve functional status prior to discharge. Family education and training will be completed. Equipment evaluations and recommendations will be completed as appropriate. · Nursing will be involved for bowel, bladder, skin, and pain management. Nursing will also provide education and training to patient and family. · Prophylaxis:  DVT: lovenox. GI: protonix. · Pain: tylenol  · Nutrition:  Consultation to dietician for nutritional counseling and recommendations. Prealbumin will be checked on admission.     · Bladder: continent  · Bowel: MOM  ·  and case management consultations for coordination of care and discharge Igor Alvarez MD

## 2017-12-24 NOTE — FLOWSHEET NOTE
Patient  was in bed. He believes in God, but has fallen out of the practice of his danni. His wife has been trying to encourage him to go to Taoist.  offered song and prayer. Continued support welcome. 12/23/17 2036   Encounter Summary   Services provided to: Patient   Referral/Consult From: [facility]   Support System Spouse; Children;Family members   Continue Visiting Yes  (12/23)   Complexity of Encounter Low   Length of Encounter 30 minutes   Spiritual/Mu-ism   Type Spiritual support   Assessment Questioning danni; Approachable   Intervention Discussed meaning/purpose;Discussed illness/injury and it's impact; Discussed relationship with God;Prayer;Explored feelings, thoughts, concerns; Active listening   Outcome Engaged in conversation; Shared reminiscences; Expressed gratitude

## 2017-12-25 NOTE — PROGRESS NOTES
Infectious Diseases Progress Note  Pt Name: Luis Bliss  MRN: [id number]  [place]: 7/29/1933  Admit Date: 12/22/2017  3:03 PM  Date of evaluation: 12/25/2017  Primary Care Physician: Lisa Mccallum MD   [id number]-A 66-year-old male with history of problems with his thoracic scoliosis and chronic problems with  breathing and he does sit for prolonged times in his recliner and he has developed ulcer in the thoracic spine that started in 07/2017. The patient was seen and evaluated in the past by Dr. Yaidra Oliva and he is referred for further evaluation and initially seen in our office on 09/25/2017. The patient also appears to have improved initially, but after a few visits over the last month, his ulcer continued to worse. The patient unfortunately sits and reclines right over the ulcer site and his scoliosis essentially is not helping it. The patient had cultures done on 12/08/2017 and that showed MRSA and the patient presented to the office with continued worsening of the cellulitis, so decision was made to admit him to the hospital for further evaluation and treatment. The patient has been noted to have problems with his elevated creatinine and also is not noted to have significant problems with anemia with hemoglobin of 6.2.   Given 2 units of PRBCs. Subjective: Interval History:  Patient in bed and wound examined with RN and wound vac leak noted again and wound vac removed    Pt/Chart review last 24 hours:  Fever No, Diarrhea No, Dyspnea No,     Objective:      Vitals: BP (!) 144/72   Pulse 107   Temp 97.4 °F (36.3 °C) (Oral)   Resp 20   Ht 5' 8\" (1.727 m)   Wt 154 lb 12.8 oz (70.2 kg)   SpO2 97%   BMI 23.54 kg/m²    HEENT: Head: Normal, normocephalic, atraumatic.   Heart: regular rate and rhythm  Lungs: clear to auscultation bilaterally  Abdomen: soft, non-tender; bowel sounds normal; no masses,  no organomegaly  Extremities: edema lower extremities with pitting 2+  Neurologic: Mental status: Alert, oriented, thought content appropriate   Wound - on the thoracic area with slough  And skin changes    Data:   Current ATBs: vanco 12/19     Scheduled Meds:   vitamin D  5,000 Units Oral Daily    enoxaparin  40 mg Subcutaneous Daily    sodium chloride flush  10 mL Intravenous 2 times per day    cloNIDine  0.1 mg Oral BID    [START ON 12/27/2017] darbepoetin gal-polysorbate  100 mcg Subcutaneous Weekly    folic acid  1 mg Oral Daily    furosemide  40 mg Oral Daily    metoprolol tartrate  25 mg Oral BID    mometasone-formoterol  2 puff Inhalation BID    multivitamin  1 tablet Oral Daily    pantoprazole  40 mg Oral Daily    predniSONE  5 mg Oral Daily    tamsulosin  0.8 mg Oral Daily    vancomycin  1 g Intravenous Q36H    [START ON 12/19/2018] vancomycin (VANCOCIN) intermittent dosing (placeholder)   Other RX Placeholder    vitamin A  10,000 Units Oral Daily    vitamin C  250 mg Oral Daily    tuberculin  5 Units Intradermal Weekly    ppd   Does not apply Weekly     Continuous Infusions:   bacitracin         I/O last 3 completed shifts: In: 560 [P.O.:540; I.V.:20]  Out: 700 [Urine:700]  No intake/output data recorded. Intake/Output Summary (Last 24 hours) at 12/25/17 1133  Last data filed at 12/25/17 0447   Gross per 24 hour   Intake              120 ml   Output              700 ml   Net             -580 ml     CBC:   Recent Labs      12/24/17   0550  12/25/17   0443   WBC  3.8*  4.4*   HGB  7.6*  7.6*   HCT  22.5*  22.8*   PLT  120*  135     BMP:    Recent Labs      12/23/17   0545  12/24/17   0550  12/25/17   0443   NA  138  134*  139   K  5.3*  4.6  4.6   CL  97*  92*  94*   CO2  36*  37*  38*   BUN  37*  55*  63*   CREATININE  1.7*  1.6*  1.6*   GLUCOSE  99  97  90     Hepatic:   No results for input(s): AST, ALT, ALB, BILITOT, ALKPHOS in the last 72 hours.   Ferritin 22 - 322 ng/mL 473       Iron 65 - 195 ug/dL 54     Comments: Performed at [facility] artery disease. 8.  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  9.  Coccyx area unstageable ulcer present on admission in association with moisture-related skin damage. 10. Chronic respiratory failure with hypoxia in the setting of COPD, being treated with chronic continuous O2   11. Wedge compression fracture of T5 & T6     Plan   Continue current antibiotics with vanco     Stool occult blood   Stop wound vac  Continue wound care with dakins  Plastics to eval the right arm and I d/w  about the thoracic ulcer   D/w    Ortho spine consult for wedge compression fractures - no intervention planned   D/w family   Back wound would need debridement     Please see today's orders for updated patient plan of care.         Electronically signed by Lavell Norwood MD on 12/25/2017 at 11:33 AM

## 2017-12-26 NOTE — PROGRESS NOTES
Yana Dickens 60  INPATIENT OCCUPATIONAL THERAPY  [facility] TCU 8E  DAILY NOTE    Time:  Time In: 831  Time Out: 5606  Timed Code Treatment Minutes: 23 Minutes  Minutes: 23          Date: 2017  Patient Name: Destiny Barnes,   Gender: male      Room: -73/073-A  MRN: [id number]  : 1933  (80 y.o.)  Referring Practitioner: Dr. Robert León   Diagnosis: cellutitis   Additional Pertinent Hx: 61-year-old male admitted to TCU due to wound on thoracic spine. Pt with history of problems with his thoracic scoliosis and chronic problems with breathing and he does sit for prolonged times in his recliner and he has developed ulcer in the thoracic spine that started in 2017. Restrictions/Precautions:  Restrictions/Precautions: General Precautions, Contact Precautions, Isolation            Position Activity Restriction  Other position/activity restrictions: thoracic ulcer on back with wound vac in place. Past Medical History:   Diagnosis Date    [facility]ab[facility]     CAD (coronary artery disease)     Cancer (HCC)     skin    Chronic CHF (congestive heart failure) ([facility] 2017    COPD (chronic obstructive pulmonary disease) (HCC)     uses 2 liters oxygen continuously    History of blood transfusion     following surgery on scalp    Hx of blood clots 2006    after aorta surgery    --   in leg, right? ?  stent inserted    Hypertension     MI (myocardial infarction)     x2     Past Surgical History:   Procedure Laterality Date    AORTA SURGERY  ??    BIOPSY EXTERNAL EAR Left 2017    EXCISION SQUAMOUS CELL CARCINOMA OF THE LEFT EAR HELIX WITH FROZEN SECTION  AND SHAVE OF SUSPICIOUS NONHEALING SQUAMOUS CELL RIGHT FOREARM performed by Shawn Mcgowan MD at [place] [id number]    [address] [place] [address]      Aorta Surgery    CARDIAC SURGERY  2006    Stent x1    EXCISION / BIOPSY SKIN LESION OF ARM Right

## 2017-12-26 NOTE — PROGRESS NOTES
0400    Patients Wedge for back   Became stuck to [place] material so removed               Placed several pillows so not to allow pressure on back wound    Positioned                For comfort     No further complaints

## 2017-12-26 NOTE — PLAN OF CARE
100-5 MCG/ACT inhaler 2 puff  2 puff Inhalation BID Katie Cast MD   2 puff at 12/26/17 4443    multivitamin 1 tablet  1 tablet Oral Daily Katie Cast MD   1 tablet at 12/26/17 1028    pantoprazole (PROTONIX) tablet 40 mg  40 mg Oral Daily Katie Cast MD   40 mg at 12/26/17 1027    predniSONE (DELTASONE) tablet 5 mg  5 mg Oral Daily Katie Cast MD   5 mg at 12/26/17 1027    tamsulosin (FLOMAX) capsule 0.8 mg  0.8 mg Oral Daily Katie Cast MD   0.8 mg at 12/26/17 1027    vancomycin (VANCOCIN) 1 g in sodium chloride 0.9% 200 mL IVPB  1 g Intravenous Q36H Katie Cast MD   1 g at 12/25/17 2307    [START ON 12/19/2018] vancomycin (VANCOCIN) intermittent dosing (placeholder)   Other RX Placeholder Katie Cast MD        vitamin A capsule 10,000 Units  10,000 Units Oral Daily Katie Cast MD   10,000 Units at 12/26/17 1027    vitamin C (ASCORBIC ACID) tablet 250 mg  250 mg Oral Daily Katie Cast MD   250 mg at 12/26/17 1028    tuberculin injection 5 Units  5 Units Intradermal Weekly Lj Guaman MD   5 Units at 12/23/17 1216    ppd (tuberculin skin test) read   Does not apply Weekly Lj Guaman MD           Plan of Care Problems and Goals (a check in the box indicates current careplan problems and goals):     [x] Problem: Pain    Goal: Pain Level will decrease   [x] Problem: Falls- Risk of    Goal: Absence of Falls   [x] Problem: Risk of Impaired Skin Integrity    Goal: Will show no infection signs and symptoms    Goal: Absence of new skin breakdown   [x] Problem: Risk of Bleeding    Goal: Will show no signs and symptoms of excessive bleeding     [x] Problem: Nutrition    Goal: Optimal nutrition therapy   [x] Problem: Discharge Barriers    Goal: Patient's continuum of care needs are met   [x] Problem: Respiratory     Goal: Clear lung sounds   [x] Problem: Infection-MRSA    Goal: Absence of methicillin-resistant

## 2017-12-26 NOTE — PROGRESS NOTES
Agreeable to 2 waffle cushions each one folded in half and placed on each side of back to offload pressure on back wound. Refusing pink abd pillow at this time. Encouraged to reposition and weight shift for decreasing pressure to back. Educated and reminded to use waffle cushions. Discussed with patient and wife about not using donut pillow from home per recommendation from ostomy nurses and [address]. Verbalized understanding.

## 2017-12-26 NOTE — PROGRESS NOTES
Admission Note for TCU     Name:  Shai Francois    MR#:    [id number]  José Miguel: [de-identified]      :   1933    Long Term Care Facility Type: Transitional Care Unit     Dx:    Thoracic spine/upper back cellulitis     Patient Active Problem List   Diagnosis    Hearing loss    [facility] ([facility]    Anemia    Scalp wound    Squamous cell skin cancer    Decubitus ulcer    Cellulitis of back except buttock    Cellulitis    Hyperkalemia    Vitamin D deficiency    MRSA (methicillin resistant staph aureus) culture positive    Chronic respiratory failure (HCC)    Chronic CHF (congestive heart failure) (HCC)    Superficial venous thrombosis of left arm       Code Status: Full Code      Rehabilitation Potential: Good     Prognosis: Good     Stability: Stable     History & Physical current: Yes   If No, note changes in H&P update     Level of Care Recommendation/Request: Skilled     Physician Certification: I certify that I have reviewed the information contained in the discharge summary and that the information is a true and accurate reflection of the individual's condition. I certify this resident is appropriate for skilled services provided in the TCU/ECF for a condition which the individual received inpatient care. Certification: I certify the orders, information, and transfer of said patient is necessary for the continuing treatment of the diagnosis listed in a skilled care setting providing skilled nursing and/or skilled rehabilitative services. The patient will require on a daily basis SNF covered care.     Electronically signed by Dorothy Gregory MD on 2017 at 12:51 PM

## 2017-12-27 NOTE — PROGRESS NOTES
oriented, thought content appropriate   Wound-on the thoracic area with slough and skin changes    Data:   Current ATBs: vanco 12/19     Scheduled Meds:   lidocaine-EPINEPHrine  20 mL Intradermal Once    sodium hypochlorite   Irrigation Daily    vitamin D  5,000 Units Oral Daily    enoxaparin  40 mg Subcutaneous Daily    sodium chloride flush  10 mL Intravenous 2 times per day    cloNIDine  0.1 mg Oral BID    darbepoetin gal-polysorbate  100 mcg Subcutaneous Weekly    folic acid  1 mg Oral Daily    furosemide  40 mg Oral Daily    metoprolol tartrate  25 mg Oral BID    mometasone-formoterol  2 puff Inhalation BID    multivitamin  1 tablet Oral Daily    pantoprazole  40 mg Oral Daily    predniSONE  5 mg Oral Daily    tamsulosin  0.8 mg Oral Daily    vancomycin  1 g Intravenous Q36H    [START ON 12/19/2018] vancomycin (VANCOCIN) intermittent dosing (placeholder)   Other RX Placeholder    vitamin A  10,000 Units Oral Daily    vitamin C  250 mg Oral Daily    tuberculin  5 Units Intradermal Weekly    ppd   Does not apply Weekly     Continuous Infusions:   bacitracin         I/O last 3 completed shifts: In: 480 [P.O.:480]  Out: 1000 [Urine:1000]  No intake/output data recorded.     Intake/Output Summary (Last 24 hours) at 12/27/17 1317  Last data filed at 12/27/17 0347   Gross per 24 hour   Intake                0 ml   Output              850 ml   Net             -850 ml     CBC:   Recent Labs      12/25/17   0443  12/26/17   0510   WBC  4.4*  4.1*   HGB  7.6*  7.5*   HCT  22.8*  22.6*   PLT  135  141     BMP:    Recent Labs      12/25/17   0443  12/26/17   0510   NA  139  138   K  4.6  4.3   CL  94*  93*   CO2  38*  38*   BUN  63*  60*   CREATININE  1.6*  1.5*   GLUCOSE  90  100     Hepatic:   Recent Labs      12/26/17   0510   AST  15   ALT  9*   BILITOT  0.3   ALKPHOS  55     Ferritin 22 - 322 ng/mL 473       Iron 65 - 195 ug/dL 54     Comments: Performed at [facility]

## 2017-12-27 NOTE — PROGRESS NOTES
needs. The patient reports that he has very attentive family which includes his 3 sons who live in the vicinity. He is planning to discharge home as soon as able and reports that he has never needed any community services. Although he is a , he has never applied for benefits. As there is not stop date as yet for IVAB, plan is to assess patient at team meeting on 1/2/18. A preliminary team meeting took place yesterday, 1/26, however, patient had only been on TCU a brief time. Will monitor progress, and assist with discharge plans, including potential referral for home health services for nursing and aide visits.        Discharge Planning  Living Arrangements: Spouse/Significant Other  Support Systems: Spouse/Significant Other  Potential Assistance Needed: N/A  Potential Assistance Purchasing Medications: No  Does patient want to participate in local refill/meds to beds program?: No  Type of Home Care Services: None  Patient expects to be discharged to[de-identified] Home with spouse  Expected Discharge Date: 01/05/18  Follow Up Appointment: Best Day/Time : Wednesday AM      Dali Rivera 12/27/2017 11:28 AM

## 2017-12-27 NOTE — PLAN OF CARE
Problem: RESPIRATORY  Goal: Clear lung sounds  Outcome: Ongoing  Will continue treatments as ordered to improve lung aeration.

## 2017-12-27 NOTE — PROGRESS NOTES
risk of additonal wounds   Long term goals  Time Frame for Long term goals : 2 weeks  Long term goal 1: Pt will complete functional mobility to and from the BR with MI and no cues for line mgt.    Long term goal 2: Pt will be educated on an HEP for strengthening ex to increase endurance and UE strength for completing home mgt task

## 2017-12-28 NOTE — PLAN OF CARE
Problem: Falls - Risk of  Goal: Absence of falls  Outcome: Ongoing  Bed in lowest position. Call light within reach. Educated patient to use call light for assistance. Problem: Nutrition  Goal: Optimal nutrition therapy  Outcome: Ongoing  Patient reports a decrease in appetite. Problem: Risk for Impaired Skin Integrity  Goal: Tissue integrity - skin and mucous membranes  Structural intactness and normal physiological function of skin and  mucous membranes. Outcome: Ongoing  Encouraged patient to turn side to side. Patient refusing at this time. Laying on back with wedge pillow. Problem: Pain:  Goal: Pain level will decrease  Pain level will decrease   Outcome: Ongoing  No reports of pain at this time. Problem: Bleeding:  Goal: Will show no signs and symptoms of excessive bleeding  Will show no signs and symptoms of excessive bleeding   Outcome: Ongoing  No signs of bleeding at this time. Problem: Infection - Methicillin-Resistant Staphylococcus Aureus Infection:  Goal: Absence of methicillin-resistant Staphylococcus aureus infection  Absence of methicillin-resistant Staphylococcus aureus infection   Outcome: Ongoing  Contact precautions in place. Problem: DISCHARGE BARRIERS  Goal: Patient's continuum of care needs are met  Outcome: Ongoing  Monitoring for discharge barriers. Comments: Care plan reviewed with patient. Patient verbalize understanding of the plan of care and contribute to goal setting.

## 2017-12-28 NOTE — PROGRESS NOTES
RECREATIONAL THERAPY  MISSED TREATMENT    [x]Transitional Care Unit  []Inpatient Rehabilitation    Date:  12/28/2017            Patient Name: Perry Cockayne           MRN: [id number]  José Miguel: [de-identified]          YOB: 1933 (80 y.o.)       Gender: male   Diagnosis: cellutitis   Physician: Referring Practitioner: Dr. Sherwood Floor:    []Hold treatment per nursing request  []Patient refusal  []Family declined treatment  []Patient at testing and/or off the floor  []Patient unavailable, with PT/OT/nursing, etc.  [x]Other pt finished his lunch-wife present-states he has newspapers here to read and watches tv but lately has been so tired-is not interested in any other leisure materials for in room use at this time-will continue to monitor for RT interests   Shaun Zambrano, [address] 12/28/2017

## 2017-12-28 NOTE — PROGRESS NOTES
Patient Name: Destiny Barnes        MRN: [id number]    : 1933  (80 y.o.)  Gender: male   Principal Problem: <principal problem not specified>    Section D - Mood     Should Resident Mood Interview be Conducted?  Attempt to conduct interview with all residents   0. No (resident is rarely/never understood) à Skip to and complete -, Staff Assessment of Mood (PHQ 9-OV)  1. Yes à Continue to , Resident Mood Interview (PHQ-9) Enter Code    1   . Resident Mood Interview (PHQ-9)   Say to resident: Shila Salt the last 2 weeks, have you been bothered by any of the following problems?    If symptom is present, enter 1(yes) in column 1, Symptom Presence. Then move to column 2, Symptom Frequency, and indicate symptom frequency. 1. Symptom Presence                   2. Symptom                                                                  Frequency  0. No (enter 0 in column 2)          0. Never or 1 day          1. Yes (enter 0-3 in column 2)      1. 2-6 days           9. No Response                               2.  7-11 days                                                 3.  12-14 days   1. Symptom Presence 2. Symptom  Frequency        Enter Scores in boxes below   A. Little interest or pleasure in doing things 0 0   B. Feeling down, depressed, or hopeless 1 1   C. Trouble falling or staying asleep, or sleeping too much 1 2   D. Feeling tired or having little energy 1 2   E. Poor appetite or overeating 1 2   F. Feeling bad about yourself  or that you are a failure, or have let your family down 0 0   G. Trouble concentrating on things, such as reading the newspaper or watching television 0 0   H. Moving or speaking so slowly that other people have noticed. Or the opposite-being so fidgety or restless that s/he has been moving around a lot more than usual   0   0   I. Thoughts that you would be better off dead, or of hurting yourself in some way.  0 0              Patient Name: Maria R Mrury Pancho Baldwin        MRN: [id number]    : 1933  (80 y.o.)  Gender: male   Principal Problem: <principal problem not specified>    Section J    Health Conditions    Should Pain Assessment Interview be Conducted? Attempt to conduct interview with all residents. If resident is comatose, skip to , Shortness of Breath (dyspnea)   Enter Code  1 0 - No à (resident is rarely/never understood) à Skip to [address] of Breath  1 - Yes à Continue to , Pain Presence   Pain Assessment Interview   Pain Presence   Enter Code  1 Ask resident: Caryn Olivaress you had pain or hurting at any time in the last 5 days?   0 - No à Skip to , Shortness of Breath  1 - Yes  à Continue to , Pain Frequency  9  Unable to answer à Skip to , Shortness of Breath    Pain Frequency   Enter Code          3 Ask resident: Jenaepenny De Anda much of the time have you experienced pain or hurting over the last 5 days?   1  Almost constantly  2  Frequently  3  Occasionally  4  Rarely  9  Unable to answer    Pain Effect on Function   Enter Code      0 Ask resident: Daniel Donn the last 5 days, has pain made it hard for you to sleep at night?   0 - No   1 - Yes   9  Unable to answer    Enter Code      0 Ask resident: Daniel Pop the last 5 days, have you limited your day-to-day activities because of pain?   0 - No   1 - Yes   9  Unable to answer     Pain Intensity   Enter Code      07 A. Numeric Rating Scale (00-10)  Ask resident: Aniceto Vasquez rate your worst pain over the last 5 days on a zero to ten scale, with zero being no pain and ten as the worst pain you can imagine.  (Show resident 00-10 pain scale)  Enter two-digit response. Enter 99 if unable to answer.

## 2017-12-28 NOTE — PROGRESS NOTES
Pharmacy Vancomycin Consult     Vancomycin Day:   10  Current Dosing:   Vancomycin 1 gram IV q 36 hours    Temp max:   96.6    Recent Labs      12/26/17   0510  12/28/17   0430   BUN  60*  71*       Recent Labs      12/26/17   0510  12/28/17   0430   CREATININE  1.5*  1.5*       Recent Labs      12/26/17   0510  12/28/17   0430   WBC  4.1*  5.2         Intake/Output Summary (Last 24 hours) at 12/28/17 1840  Last data filed at 12/28/17 1717   Gross per 24 hour   Intake 120 ml   Output 806 ml   Net -686 ml     Culture Date      Source                       Results  12/27/17  Back tissue culture  -  pending      Ht Readings from Last 1 Encounters:   12/22/17 5' 8\" (1.727 m)        Wt Readings from Last 1 Encounters:   12/28/17 153 lb 9.6 oz (69.7 kg)         Body mass index is 23.35 kg/m². Estimated Creatinine Clearance: 35 mL/min (based on SCr of 1.5 mg/dL). Trough:   19.8    Assessment/Plan:    Will change the Vancomycin dose to Vancomycin 1 gram iv q 48 hours.

## 2017-12-28 NOTE — PLAN OF CARE
Problem: Risk for Impaired Skin Integrity  Goal: Tissue integrity - skin and mucous membranes  Structural intactness and normal physiological function of skin and  mucous membranes. Outcome: Ongoing  In to see patient to re assess wounds to the left of coccyx/buttock, right arm and back. Patient and nurse state Dr. Maria R Baca in this am and changed the dakins gauze dressing to back wound. Will re assess it at another time. Right arm gauze dressing and ace wrap removed. Graft site to the right lower arm is healed. Scabbing noted to the edges attached to the staples. Area cleansed with saline. Gauze and kerlix applied. Dr. Loredo Cherri to come and assess today. Ace wrap left off for now to allow patient to eat. Nurse will re wrap ace later. Assisted patient to turn to his right side to assess coccyx area. Scabbing area noted to the left of the coccyx that is an improving present on admission unstageable pressure injury. Dr. Maria R Baca has ordered bordered foam to the area. Foam in place and re position after assessment. Patient denies incontinence. Patient is on an SPR mattress and has the wedge cushion in place to off load back wound per Dr. Maria R Baca. Waffle cushion is in chair. Will cont to follow. Care plan reviewed with patient and RN. Patient and RN verbalize understanding of the plan of care and contribute to goal setting.           Wound 12/19/17 Buttocks Left unstageable with MASD (Active)   Wound Type Wound 12/28/2017 10:45 AM   Wound Unstageable 12/28/2017 10:45 AM   Dressing Status Intact 12/28/2017 10:45 AM   Dressing Changed Changed/New 12/28/2017 10:45 AM   Dressing/Treatment Foam 12/28/2017 10:45 AM   Wound Length (cm) 1 cm 12/28/2017 10:45 AM   Wound Width (cm) 0.5 cm 12/28/2017 10:45 AM   Wound Depth (cm)  scab 12/28/2017 10:45 AM   Calculated Wound Size (cm^2) (l*w) 0.5 cm^2 12/28/2017 10:45 AM   Change in Wound Size % (l*w) -233.33 12/28/2017 10:45 AM   Drainage Amount None 12/28/2017 10:45 AM   Black%Wound Bed scab 12/28/2017 10:45 AM   Number of days: 9       Incision 12/11/17 Arm Right (Active)   Wound Assessment Clean;Dry; Intact 12/28/2017 10:45 AM   Michelle-wound Assessment Dry 12/28/2017 10:45 AM   Wound Length (cm) 4 cm 12/28/2017 10:45 AM   Wound Width (cm) 3.8 cm 12/28/2017 10:45 AM   Closure Staples 12/28/2017 10:45 AM   Drainage Amount None 12/28/2017 10:45 AM   Dressing/Treatment Dry dressing 12/28/2017 10:45 AM        Number of days: 16         Comments:   Coccyx     Right lower arm

## 2017-12-29 NOTE — PROGRESS NOTES
theraband exercises 1 set x 15 repetitions all joints in all planes sitting in bedside chair. Rest break between each section of exercise. Exercises completed to increase endurance for ADLs            U BALANCE TEST: 0 1 2 8   Balance during to/from sit to stand  x     Balance during walking  x     Balance during turn-around and while walking  x     Balance moving on and off toilet    x   Balance during surface to/from surface transfers    x   0 = Steady at all times  1 = Not steady, but able to stabilize without human assistance  2 = Not steady, only able to stabilize with human assistance  8 = Activity did not occur           Activity Tolerance:  Activity Tolerance: Patient Tolerated treatment well;Patient limited by fatigue    Assessment:     Performance deficits / Impairments: Decreased functional mobility , Decreased safe awareness, Decreased balance, Decreased ADL status, Decreased endurance, Decreased high-level IADLs  Prognosis: Fair  Discharge Recommendations: Patient would benefit from continued therapy after discharge    Patient Education:  Patient Education: UE exercise, safety with RW     Equipment Recommendations: Other: Need to assess for positioning devices for thoracic spine for education of spouse, seated in supine, ? sock aide or fidelina hose aide. Safety:  Safety Devices in place: Yes  Type of devices: Call light within reach, All fall risk precautions in place, Gait belt, Chair alarm in place, Left in chair    Plan:  Times per week: 3x MWF  Current Treatment Recommendations: Strengthening, Endurance Training, Self-Care / ADL, Equipment Evaluation, Education, & procurement, Balance Training, Functional Mobility Training, Safety Education & Training, Positioning, Patient/Caregiver Education & Training    Goals:  Patient goals : Go home when able.     Short term goals  Time Frame for Short term goals: 1 week  Short term goal 1: PT will complete ADL routine with S and no cues for energy conservation or wound vac  line management, LHAE when needed to increase independence in home self care tasks   Short term goal 2: Pt/spouse will be educated on safe functional mobility to and from the BR as well as around obstacles in room and salguero with MI as well as managing lines to increase safety for home functional mobility tasks  Short term goal 3: Pt will complete simple homemaking tasks with S and no cues for safety with O2 line and wound vac line to in prep for home mobility around the kitchen   Short term goal 4: Pt/ spouse will be educated on repositioning techniques in sitting and supine to prevent further pressure ulcers including any positioning devices for pressure relief to decrease risk of additonal wounds   Long term goals  Time Frame for Long term goals : 2 weeks  Long term goal 1: Pt will complete functional mobility to and from the BR with MI and no cues for line mgt.    Long term goal 2: Pt will be educated on an HEP for strengthening ex to increase endurance and UE strength for completing home mgt task

## 2017-12-29 NOTE — FLOWSHEET NOTE
IV team notified for cath brandon to PICC line x 2 this shift. No call back as of yet. Message left both times. Awaiting callback.

## 2017-12-29 NOTE — PROGRESS NOTES
S: Pt reports generalized sense of weakness. Decreased appetite  O: right arm skin graft healing well. No evidence of infection. Ulcer over thoracic spine appears larger than last exam. No drainage. A: Right arm skin graft healing well      Ulcer to thoracic spine larger than last evaluation by Dr. Luc Brown  P: 1)Right arm sutures and staples to be removed. Light dry dressing to be applied. 2)  Case was discussed with Dr. Luc Brown. He does not recommend any surgical intervention in the form of skin grafting or flap due to high risk of infection and breakdown. We recommend continued local wound care with Dr. Phuong Santamaria.

## 2017-12-29 NOTE — PROGRESS NOTES
Infectious Diseases Progress Note  Pt Name: Jen Beverly  MRN: [id number]  [place]: 7/29/1933  Admit Date: 12/22/2017  3:03 PM  Date of evaluation: 12/29/2017  Primary Care Physician: Tr Montelongo MD   [id number] 77-year-old male with history of problems with his thoracic scoliosis and chronic problems with  breathing and he does sit for prolonged times in his recliner and he has developed ulcer in the thoracic spine that started in 07/2017. The patient was seen and evaluated in the past by Dr. Dariel Arthur and he is referred for further evaluation and initially seen in our office on 09/25/2017. The patient also appears to have improved initially, but after a few visits over the last month, his ulcer continued to worse. The patient unfortunately sits and reclines right over the ulcer site and his scoliosis essentially is not helping it. The patient had cultures done on 12/08/2017 and that showed MRSA and the patient presented to the office with continued worsening of the cellulitis, so decision was made to admit him to the hospital for further evaluation and treatment. The patient has been noted to have problems with his elevated creatinine and also is not noted to have significant problems with anemia with hemoglobin of 6.2.   Given 2 units of PRBCs. He still continues to lean against the back. Catrina Cherise His body habitus is a major problem. Subjective: Interval History:  Patient in bed and d/w rn about wound care       Pt/Chart review last 24 hours:  Fever No, Diarrhea No, Dyspnea No,     Objective:      Vitals: BP (!) 131/56   Pulse 99   Temp 95.6 °F (35.3 °C) (Oral)   Resp 22   Ht 5' 8\" (1.727 m)   Wt 130 lb (59 kg)   SpO2 96%   BMI 19.77 kg/m²    HEENT: Head: Normal, normocephalic, atraumatic.   Heart: regular rate and rhythm  Lungs: clear to auscultation bilaterally  Abdomen: soft, non-tender; bowel sounds normal; no masses,  no organomegaly  Extremities: edema lower extremities with pitting disease. 8.  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  9.  Coccyx area unstageable ulcer present on admission in association with moisture-related skin damage. 10. Chronic respiratory failure with hypoxia in the setting of COPD, being treated with chronic continuous O2   11. Wedge compression fracture of T5 & T6     Plan   Continue current antibiotics with vanco   Monitor creatinine  [facility]  Ortho spine consult for wedge compression fractures - no intervention planned   Needs foam offloading   Will d/w    D/w rn        Electronically signed by Dang Marrero MD on 12/29/2017 at 8:20 AM

## 2017-12-29 NOTE — PROGRESS NOTES
Call from micro received to inform of critically high CO2 level. Dr. Yessenia Livingston on unit and made aware. No new orders received.

## 2017-12-29 NOTE — PATIENT CARE CONFERENCE
[facility]  Transitional Care Unit  Team Conference Note    Patient Name: Dionna Menard   MRN: [id number]    : 1933  (80 y.o.)  Gender: male   Referring Practitioner: Ordering and Attending: ELGIN Salazar MD  Diagnosis: cellulitis    Team Conference Date: 2018   Admission Date:     0:32 PM  Re-Certification Date: 7635    :  Tentative Discharge Plan and current psychosocial issues: The patient lives with his spouse   and was admitted to the hospital for treatment of a long standing wound to his mid back. He is typically independent at home, as is his spouse. Now admitted to TCU for ongoing treatment of his back wound, which includes IVAB and dressing changes. He does not require PT at this time, however, OT is working with him on bathing and dressing needs. The patient reports that he has very attentive family which includes his 3 sons who live in the vicinity. He is planning to discharge home as soon as able and reports that he has never needed any community services. Although he is a , he has never applied for benefits. As there is not stop date as yet for IVAB, plan is to assess patient at team meeting on 18. A preliminary team meeting took place yesterday, , however, patient had only been on TCU a brief time. Will monitor progress, and assist with discharge plans, including potential referral for home health services for nursing and aide visits.           Nursing:     Weight:  Weight: has been stable since 17     Wounds/Incisions/Ulcers:  Skin/Wound Issues: back and coccyx wound   Bowel: continent  Bladder:continent     Pain: Patient's pain is currently controlled with tylenol and tramadol    Education Provided:  Diabetic:  No  Peg Tube:No    [place] Care:  Education:NA  Removal Necessary:  NA  Supplies Needed: NA     Anticoagulant Use:  Patient on lovenox for anticoagulation, which is being managed by Primary Care Physician    Antibiotic
socks at home)       Speech Therapy:       Nutrition:    Weight: 154 lb 8 oz (70.1 kg) / Body mass index is 23.49 kg/m². Please see nutrition note for details. Family Education: Family available and participating in education   Fall Risk:  Falling star program initiated    Goal Achievement:   Patient Continues to progress toward goal achievement    Discharge Plan   Estimated Length of Stay: re eval  Destination: discharge home with supervision  Services at Discharge: [address] and aide 3x week  Equipment at Discharge: No equipment needs  Factors facilitating achievement of predicted outcomes: Family support, Motivated, Cooperative and Pleasant  Barriers to the achievement of predicted outcomes: Decreased endurance and Wound Care    Readmission Risk              Readmission Risk:        13.5       Age 72 or Greater:  1    Admitted from SNF or Requires Paid or Family Care:  0    Currently has CHF,COPD,ARF,CRI,or is on dialysis:  4    Takes more than 5 Prescription Medications:  4    Takes Digoxin,Insulin,Anticoagulants,Narcotics or ASA/Plavix:  [address] in Past 12 Months:  0    On Disability:  0    Patient Considers own Health:  2.5        High (20-31)     Moderate (10-19)     Low (0-9)    Team Members Present at Conference:  Physician: Dr. Kayla Taylor MD  Nurse: Teresa Lugo RN, Carly Pillai RN  :  [employer]  Occupational Therapist:  SHAHID Johnson  Physical Therapist:   Luna Calvo, PTA [id number]  Speech Therapist: Speech Therapist: N/A. All team members have reviewed and updated as necessary and appropriate the established interdisciplinary plan of care within the medical record of Noah Feng. Pt's team conference attended (see above). Pt seen on rounds with LSW, to review the results with patient and family. Discussed length of stay as above.   Pt's team conference attended (see above.)  Pt seen on rounds with LSW, to review the results with patient and

## 2017-12-29 NOTE — PROGRESS NOTES
Nutrition Assessment    Type and Reason for Visit: Reassess    Nutrition Recommendations: MD to advise re: would appetite stimulant benefit pt? Malnutrition Assessment:  · Malnutrition Status: Meets the criteria for severe malnutrition  · Context: Acute illness or injury  · Findings of the 6 clinical characteristics of malnutrition (Minimum of 2 out of 6 clinical characteristics is required to make the diagnosis of moderate or severe Protein Calorie Malnutrition based on AND/ASPEN Guidelines):  1. Energy Intake-Less than or equal to 75%, greater than or equal to 1 month    2. Weight Loss-10% loss or greater,  (10 days)  3. Fat Loss-Moderate subcutaneous fat loss, Orbital  4. Muscle Loss-Moderate muscle mass loss, Temples (temporalis muscle), Clavicles (pectoralis and deltoids)  5. Fluid Accumulation-No significant fluid accumulation, Extremities  6.  Strength-Not measured    Nutrition Diagnosis:   · Problem: Severe malnutrition, in context of acute illness or injury  · Etiology: related to Insufficient energy/nutrient consumption     Signs and symptoms:  as evidenced by Moderate loss of subcutaneous fat, Moderate muscle loss, Weight loss    Nutrition Assessment:  · Subjective Assessment: Pt. & spouse seen. He mentions poor appetite since admit. Labs (12/29) include: BUN 63, Creatinine 1.6, Hemoglobin 7.7. Tired of magic cups but did eat half of it.    · Wound Type: Multiple, Unstageable, Pressure Ulcer (unstageable pressure ulcer to mid back; left buttock unstageable w/MASD, R. right wound)  · Current Nutrition Therapies:  · Oral Diet Orders: General   · Oral Diet intake: 1-25%, 26-50%, 51-75%  · Oral Nutrition Supplement (ONS) Orders: Wound Healing Supplement, Standard High Calorie Oral Supplement  · ONS intake: 26-50%, 1-25%  · Anthropometric Measures:  · Ht: 5' 8\" (172.7 cm)   · Current Body Wt: 130 lb 1.1 oz (59 kg)  · Admission Body Wt: 154 lb 4.8 oz (70 kg) (12/19; +1 pitting edema BLE)  · Usual

## 2017-12-30 NOTE — PROGRESS NOTES
Medical Lab [address] [facility], [address]       Urine:       MICRO:    Specimen: Wound Updated: 12/20/17 1008    Gram Stain Result Rare segmented neutrophils observed. No epithelial cells observed. Many gram positive cocci occurring singly and in pairs. Narrative:         Lab Results   Component Value Date    BC No growth-preliminary  No growth   12/19/2017       IMAGING per radiologist interpretation: CT spine    Impression:       1. There is soft tissue swelling overlying the spinous processes of the thoracic spine. Specifically, the area of soft tissue swelling overlying the spinous processes between the scapulae, in the area of reported concern, demonstrate no definite evidence   of osseous erosion. If there is clinical concern for osteomyelitis, further characterization could be obtained with MRI. 2. Multiple anterior wedge compression deformities are demonstrated involving the T3-T10 levels. There is also mild anterior wedge compression deformity at T12. The anterior wedge compression deformities of T5 and T6 demonstrate a cortical step-off along   the superior endplates suggesting possible acute on chronic fracture. Correlate clinically for point tenderness at these locations. 3. Severe bilateral emphysematous changes noted. 4. Small bilateral pleural effusions are demonstrated. Impression:       1. Multiple anterior wedge compression deformities involving multiple thoracic levels from T4 through T10. These are of indeterminate age. There is resultant accentuated thoracic kyphosis. No definite erosive changes are identified. However, please refer   to thoracic spine from same day for further description. Assessment    1. Thoracic spine/upper back cellulitis. 2.  Unstageable thoracic ulcer with pressure ulcer to the back that is  worsening. 3.  Cellulitis of the skin of the back, thoracic region. 4. MRSA infection. 5.  Chronic kidney disease.   6.  Severe anemia with

## 2017-12-31 NOTE — PROGRESS NOTES
Dressing change done to back per written orders. Wet/dry dressing soaked in dakins solution . No drainage noted. Area Yellow and red in color . Tolerated well. Up in chair at this hour with call light within reach and voiced understanding to call if needing assistance.

## 2018-01-01 ENCOUNTER — APPOINTMENT (OUTPATIENT)
Dept: GENERAL RADIOLOGY | Age: 83
DRG: 871 | End: 2018-01-01
Payer: MEDICARE

## 2018-01-01 ENCOUNTER — HOSPITAL ENCOUNTER (INPATIENT)
Age: 83
LOS: 1 days | DRG: 871 | End: 2018-02-13
Attending: EMERGENCY MEDICINE | Admitting: INTERNAL MEDICINE
Payer: MEDICARE

## 2018-01-01 ENCOUNTER — APPOINTMENT (OUTPATIENT)
Dept: CT IMAGING | Age: 83
DRG: 871 | End: 2018-01-01
Payer: MEDICARE

## 2018-01-01 VITALS
HEART RATE: 88 BPM | WEIGHT: 120.4 LBS | BODY MASS INDEX: 18.25 KG/M2 | RESPIRATION RATE: 18 BRPM | HEIGHT: 68 IN | OXYGEN SATURATION: 95 % | SYSTOLIC BLOOD PRESSURE: 114 MMHG | TEMPERATURE: 97.4 F | DIASTOLIC BLOOD PRESSURE: 58 MMHG

## 2018-01-01 VITALS
HEIGHT: 68 IN | BODY MASS INDEX: 25.66 KG/M2 | TEMPERATURE: 92.5 F | WEIGHT: 169.31 LBS | DIASTOLIC BLOOD PRESSURE: 34 MMHG | SYSTOLIC BLOOD PRESSURE: 50 MMHG

## 2018-01-01 DIAGNOSIS — D64.9 ANEMIA, UNSPECIFIED TYPE: ICD-10-CM

## 2018-01-01 DIAGNOSIS — N18.9 ACUTE ON CHRONIC RENAL INSUFFICIENCY: ICD-10-CM

## 2018-01-01 DIAGNOSIS — T68.XXXA HYPOTHERMIA, INITIAL ENCOUNTER: ICD-10-CM

## 2018-01-01 DIAGNOSIS — R77.8 ELEVATED TROPONIN: ICD-10-CM

## 2018-01-01 DIAGNOSIS — J18.9 PNEUMONIA OF BOTH LUNGS DUE TO INFECTIOUS ORGANISM, UNSPECIFIED PART OF LUNG: ICD-10-CM

## 2018-01-01 DIAGNOSIS — N28.9 ACUTE ON CHRONIC RENAL INSUFFICIENCY: ICD-10-CM

## 2018-01-01 DIAGNOSIS — R31.9 HEMATURIA, UNSPECIFIED TYPE: ICD-10-CM

## 2018-01-01 DIAGNOSIS — E87.20 LACTIC ACIDOSIS: ICD-10-CM

## 2018-01-01 DIAGNOSIS — J96.02 ACUTE RESPIRATORY FAILURE WITH HYPERCAPNIA (HCC): ICD-10-CM

## 2018-01-01 DIAGNOSIS — I46.9 CARDIAC ARREST, CAUSE UNSPECIFIED (HCC): Primary | ICD-10-CM

## 2018-01-01 LAB
ABO: NORMAL
AEROBIC CULTURE: ABNORMAL
AEROBIC CULTURE: ABNORMAL
ALBUMIN SERPL-MCNC: 2.8 G/DL (ref 3.5–5.1)
ALBUMIN SERPL-MCNC: 3 G/DL (ref 3.5–5.1)
ALLEN TEST: POSITIVE
ALP BLD-CCNC: 56 U/L (ref 38–126)
ALP BLD-CCNC: 71 U/L (ref 38–126)
ALT SERPL-CCNC: 115 U/L (ref 11–66)
ALT SERPL-CCNC: 89 U/L (ref 11–66)
ANAEROBIC CULTURE: ABNORMAL
ANION GAP SERPL CALCULATED.3IONS-SCNC: 11 MEQ/L (ref 8–16)
ANION GAP SERPL CALCULATED.3IONS-SCNC: 12 MEQ/L (ref 8–16)
ANION GAP SERPL CALCULATED.3IONS-SCNC: 4 MEQ/L (ref 8–16)
ANION GAP SERPL CALCULATED.3IONS-SCNC: 5 MEQ/L (ref 8–16)
ANION GAP SERPL CALCULATED.3IONS-SCNC: 5 MEQ/L (ref 8–16)
ANION GAP SERPL CALCULATED.3IONS-SCNC: 6 MEQ/L (ref 8–16)
ANISOCYTOSIS: ABNORMAL
ANTIBODY SCREEN: NORMAL
APTT: 31.3 SECONDS (ref 22–38)
APTT: 32.4 SECONDS (ref 22–38)
AST SERPL-CCNC: 74 U/L (ref 5–40)
AST SERPL-CCNC: 97 U/L (ref 5–40)
BACTERIA: ABNORMAL /HPF
BANDED NEUTROPHILS ABSOLUTE COUNT: 0.1 THOU/MM3
BANDS PRESENT: 2 %
BASE EXCESS (CALCULATED): 2.8 MMOL/L (ref -2.5–2.5)
BASE EXCESS (CALCULATED): 5.3 MMOL/L (ref -2.5–2.5)
BASE EXCESS (CALCULATED): 7.4 MMOL/L (ref -2.5–2.5)
BASE EXCESS (CALCULATED): 7.5 MMOL/L (ref -2.5–2.5)
BASOPHILIA: ABNORMAL
BASOPHILIC STIPPLING: ABNORMAL
BASOPHILS # BLD: 1 %
BASOPHILS ABSOLUTE: 0 THOU/MM3 (ref 0–0.1)
BILIRUB SERPL-MCNC: 0.2 MG/DL (ref 0.3–1.2)
BILIRUB SERPL-MCNC: 1.1 MG/DL (ref 0.3–1.2)
BILIRUBIN URINE: NEGATIVE
BLOOD, URINE: ABNORMAL
BUN BLDV-MCNC: 105 MG/DL (ref 7–22)
BUN BLDV-MCNC: 107 MG/DL (ref 7–22)
BUN BLDV-MCNC: 107 MG/DL (ref 7–22)
BUN BLDV-MCNC: 109 MG/DL (ref 7–22)
BUN BLDV-MCNC: 109 MG/DL (ref 7–22)
BUN BLDV-MCNC: 111 MG/DL (ref 7–22)
BUN BLDV-MCNC: 75 MG/DL (ref 7–22)
BUN BLDV-MCNC: 77 MG/DL (ref 7–22)
BUN BLDV-MCNC: 80 MG/DL (ref 7–22)
BUN BLDV-MCNC: 83 MG/DL (ref 7–22)
CALCIUM IONIZED: 1.01 MMOL/L (ref 1.12–1.32)
CALCIUM IONIZED: 1.02 MMOL/L (ref 1.12–1.32)
CALCIUM IONIZED: 1.03 MMOL/L (ref 1.12–1.32)
CALCIUM SERPL-MCNC: 8.1 MG/DL (ref 8.5–10.5)
CALCIUM SERPL-MCNC: 8.2 MG/DL (ref 8.5–10.5)
CALCIUM SERPL-MCNC: 8.3 MG/DL (ref 8.5–10.5)
CALCIUM SERPL-MCNC: 8.3 MG/DL (ref 8.5–10.5)
CALCIUM SERPL-MCNC: 9.6 MG/DL (ref 8.5–10.5)
CALCIUM SERPL-MCNC: 9.7 MG/DL (ref 8.5–10.5)
CALCIUM SERPL-MCNC: 9.7 MG/DL (ref 8.5–10.5)
CALCIUM SERPL-MCNC: 9.9 MG/DL (ref 8.5–10.5)
CASTS 2: ABNORMAL /LPF
CASTS UA: ABNORMAL /LPF
CHARACTER, URINE: ABNORMAL
CHLORIDE BLD-SCNC: 87 MEQ/L (ref 98–111)
CHLORIDE BLD-SCNC: 88 MEQ/L (ref 98–111)
CHLORIDE BLD-SCNC: 89 MEQ/L (ref 98–111)
CHLORIDE BLD-SCNC: 89 MEQ/L (ref 98–111)
CHLORIDE BLD-SCNC: 95 MEQ/L (ref 98–111)
CHLORIDE BLD-SCNC: 96 MEQ/L (ref 98–111)
CHLORIDE BLD-SCNC: 97 MEQ/L (ref 98–111)
CHLORIDE BLD-SCNC: 98 MEQ/L (ref 98–111)
CO2: 31 MEQ/L (ref 23–33)
CO2: 31 MEQ/L (ref 23–33)
CO2: 32 MEQ/L (ref 23–33)
CO2: 32 MEQ/L (ref 23–33)
CO2: 33 MEQ/L (ref 23–33)
CO2: 33 MEQ/L (ref 23–33)
CO2: 43 MEQ/L (ref 23–33)
CO2: 44 MEQ/L (ref 23–33)
CO2: 44 MEQ/L (ref 23–33)
CO2: 45 MEQ/L (ref 23–33)
COLLECTED BY:: ABNORMAL
COLOR: ABNORMAL
CREAT SERPL-MCNC: 1.4 MG/DL (ref 0.4–1.2)
CREAT SERPL-MCNC: 1.6 MG/DL (ref 0.4–1.2)
CREAT SERPL-MCNC: 1.6 MG/DL (ref 0.4–1.2)
CREAT SERPL-MCNC: 1.7 MG/DL (ref 0.4–1.2)
CREAT SERPL-MCNC: 2.5 MG/DL (ref 0.4–1.2)
CREAT SERPL-MCNC: 2.6 MG/DL (ref 0.4–1.2)
CREAT SERPL-MCNC: 2.7 MG/DL (ref 0.4–1.2)
CREAT SERPL-MCNC: 2.7 MG/DL (ref 0.4–1.2)
CRYSTALS, UA: ABNORMAL
DEVICE: ABNORMAL
DIFFERENTIAL, MANUAL: NORMAL
EKG ATRIAL RATE: 88 BPM
EKG ATRIAL RATE: 89 BPM
EKG P AXIS: 56 DEGREES
EKG P AXIS: 66 DEGREES
EKG P-R INTERVAL: 188 MS
EKG P-R INTERVAL: 232 MS
EKG Q-T INTERVAL: 346 MS
EKG Q-T INTERVAL: 356 MS
EKG QRS DURATION: 88 MS
EKG QRS DURATION: 98 MS
EKG QTC CALCULATION (BAZETT): 420 MS
EKG QTC CALCULATION (BAZETT): 430 MS
EKG R AXIS: 72 DEGREES
EKG R AXIS: 82 DEGREES
EKG T AXIS: 47 DEGREES
EKG T AXIS: 72 DEGREES
EKG VENTRICULAR RATE: 88 BPM
EKG VENTRICULAR RATE: 89 BPM
EOSINOPHIL # BLD: 0 %
EOSINOPHILS ABSOLUTE: 0 THOU/MM3 (ref 0–0.4)
EPITHELIAL CELLS, UA: ABNORMAL /HPF
FLU A ANTIGEN: NEGATIVE
FLU B ANTIGEN: NEGATIVE
GFR SERPL CREATININE-BSD FRML MDRD: 23 ML/MIN/1.73M2
GFR SERPL CREATININE-BSD FRML MDRD: 23 ML/MIN/1.73M2
GFR SERPL CREATININE-BSD FRML MDRD: 24 ML/MIN/1.73M2
GFR SERPL CREATININE-BSD FRML MDRD: 25 ML/MIN/1.73M2
GFR SERPL CREATININE-BSD FRML MDRD: 25 ML/MIN/1.73M2
GFR SERPL CREATININE-BSD FRML MDRD: 39 ML/MIN/1.73M2
GFR SERPL CREATININE-BSD FRML MDRD: 41 ML/MIN/1.73M2
GFR SERPL CREATININE-BSD FRML MDRD: 41 ML/MIN/1.73M2
GFR SERPL CREATININE-BSD FRML MDRD: 48 ML/MIN/1.73M2
GLUCOSE BLD-MCNC: 108 MG/DL (ref 70–108)
GLUCOSE BLD-MCNC: 111 MG/DL (ref 70–108)
GLUCOSE BLD-MCNC: 121 MG/DL (ref 70–108)
GLUCOSE BLD-MCNC: 122 MG/DL (ref 70–108)
GLUCOSE BLD-MCNC: 126 MG/DL (ref 70–108)
GLUCOSE BLD-MCNC: 134 MG/DL (ref 70–108)
GLUCOSE BLD-MCNC: 134 MG/DL (ref 70–108)
GLUCOSE BLD-MCNC: 152 MG/DL (ref 70–108)
GLUCOSE BLD-MCNC: 171 MG/DL (ref 70–108)
GLUCOSE BLD-MCNC: 171 MG/DL (ref 70–108)
GLUCOSE BLD-MCNC: 191 MG/DL (ref 70–108)
GLUCOSE BLD-MCNC: 203 MG/DL (ref 70–108)
GLUCOSE BLD-MCNC: 208 MG/DL (ref 70–108)
GLUCOSE BLD-MCNC: 87 MG/DL (ref 70–108)
GLUCOSE BLD-MCNC: 98 MG/DL (ref 70–108)
GLUCOSE URINE: NEGATIVE MG/DL
GLUCOSE, WHOLE BLOOD: 113 MG/DL (ref 70–108)
GLUCOSE, WHOLE BLOOD: 120 MG/DL (ref 70–108)
GLUCOSE, WHOLE BLOOD: 123 MG/DL (ref 70–108)
GLUCOSE, WHOLE BLOOD: 126 MG/DL (ref 70–108)
GLUCOSE, WHOLE BLOOD: 156 MG/DL (ref 70–108)
GLUCOSE, WHOLE BLOOD: 178 MG/DL (ref 70–108)
GLUCOSE, WHOLE BLOOD: 79 MG/DL (ref 70–108)
GLUCOSE, WHOLE BLOOD: 80 MG/DL (ref 70–108)
GLUCOSE, WHOLE BLOOD: 89 MG/DL (ref 70–108)
GRAM STAIN RESULT: ABNORMAL
HCO3: 33 MMOL/L (ref 23–28)
HCO3: 35 MMOL/L (ref 23–28)
HCT VFR BLD CALC: 21.9 % (ref 42–52)
HCT VFR BLD CALC: 22 % (ref 42–52)
HCT VFR BLD CALC: 23.2 % (ref 42–52)
HCT VFR BLD CALC: 23.7 % (ref 42–52)
HEMOGLOBIN: 7 GM/DL (ref 14–18)
HEMOGLOBIN: 7.3 GM/DL (ref 14–18)
HEMOGLOBIN: 7.6 GM/DL (ref 14–18)
HEMOGLOBIN: 7.9 GM/DL (ref 14–18)
HYPOCHROMIA: ABNORMAL
IFIO2: 100
IFIO2: 50
IFIO2: 55
IFIO2: 70
INR BLD: 0.98 (ref 0.85–1.13)
INR BLD: 1.11 (ref 0.85–1.13)
KETONES, URINE: NEGATIVE
LACTIC ACID: 1.8 MMOL/L (ref 0.5–2.2)
LACTIC ACID: 2.7 MMOL/L (ref 0.5–2.2)
LACTIC ACID: 4 MMOL/L (ref 0.5–2.2)
LEUKOCYTE ESTERASE, URINE: ABNORMAL
LIPASE: 21.7 U/L (ref 5.6–51.3)
LYMPHOCYTES # BLD: 9 %
LYMPHOCYTES ABSOLUTE: 0.4 THOU/MM3 (ref 1–4.8)
MACROCYTES: ABNORMAL
MAGNESIUM: 2.4 MG/DL (ref 1.6–2.4)
MAGNESIUM: 2.5 MG/DL (ref 1.6–2.4)
MAGNESIUM: 2.6 MG/DL (ref 1.6–2.4)
MCH RBC QN AUTO: 32.2 PG (ref 27–31)
MCH RBC QN AUTO: 33.5 PG (ref 27–31)
MCH RBC QN AUTO: 33.6 PG (ref 27–31)
MCH RBC QN AUTO: 35.4 PG (ref 27–31)
MCHC RBC AUTO-ENTMCNC: 31.9 GM/DL (ref 33–37)
MCHC RBC AUTO-ENTMCNC: 32 GM/DL (ref 33–37)
MCHC RBC AUTO-ENTMCNC: 33.1 GM/DL (ref 33–37)
MCHC RBC AUTO-ENTMCNC: 34.1 GM/DL (ref 33–37)
MCV RBC AUTO: 100.8 FL (ref 80–94)
MCV RBC AUTO: 101.5 FL (ref 80–94)
MCV RBC AUTO: 110.5 FL (ref 80–94)
MCV RBC AUTO: 98.3 FL (ref 80–94)
METAMYELOCYTES: 4 %
MISCELLANEOUS 2: ABNORMAL
MODE: AC
MONOCYTES # BLD: 4 %
MONOCYTES ABSOLUTE: 0.2 THOU/MM3 (ref 0.4–1.3)
MRSA SCREEN RT-PCR: POSITIVE
MYELOCYTES: 5 %
NITRITE, URINE: NEGATIVE
NUCLEATED RED BLOOD CELLS: 10 /100 WBC
O2 SATURATION: 100 %
O2 SATURATION: 85 %
O2 SATURATION: 86 %
O2 SATURATION: 90 %
ORGANISM: ABNORMAL
ORGANISM: ABNORMAL
OSMOLALITY CALCULATION: 298.9 MOSMOL/KG (ref 275–300)
OSMOLALITY CALCULATION: 300.7 MOSMOL/KG (ref 275–300)
OSMOLALITY CALCULATION: 302.2 MOSMOL/KG (ref 275–300)
OVALOCYTES: ABNORMAL
PATHOLOGIST REVIEW: ABNORMAL
PCO2 (TEMP CORRECTED): 45 (ref 35–45)
PCO2 (TEMP CORRECTED): 62 (ref 35–45)
PCO2: 54 MMHG (ref 35–45)
PCO2: 68 MMHG (ref 35–45)
PCO2: 74 MMHG (ref 35–45)
PCO2: 92 MMHG (ref 35–45)
PDW BLD-RTO: 24.6 % (ref 11.5–14.5)
PDW BLD-RTO: 25.7 % (ref 11.5–14.5)
PDW BLD-RTO: 27.1 % (ref 11.5–14.5)
PDW BLD-RTO: 27.2 % (ref 11.5–14.5)
PH (TEMPERATURE CORRECTED): 7.34 (ref 7.35–7.45)
PH (TEMPERATURE CORRECTED): 7.46 (ref 7.35–7.45)
PH BLOOD GAS: 7.16 (ref 7.35–7.45)
PH BLOOD GAS: 7.29 (ref 7.35–7.45)
PH BLOOD GAS: 7.29 (ref 7.35–7.45)
PH BLOOD GAS: 7.4 (ref 7.35–7.45)
PH UA: 5.5
PHOSPHORUS: 5.1 MG/DL (ref 2.4–4.7)
PHOSPHORUS: 6 MG/DL (ref 2.4–4.7)
PHOSPHORUS: 6.7 MG/DL (ref 2.4–4.7)
PLATELET # BLD: 110 THOU/MM3 (ref 130–400)
PLATELET # BLD: 145 THOU/MM3 (ref 130–400)
PLATELET # BLD: 168 THOU/MM3 (ref 130–400)
PLATELET # BLD: 195 THOU/MM3 (ref 130–400)
PLATELET ESTIMATE: ADEQUATE
PMV BLD AUTO: 10.1 FL (ref 7.4–10.4)
PMV BLD AUTO: 10.2 FL (ref 7.4–10.4)
PMV BLD AUTO: 8.8 MCM (ref 7.4–10.4)
PMV BLD AUTO: 9 MCM (ref 7.4–10.4)
PO2 (TEMP CORRECTED): 40 (ref 71–104)
PO2 (TEMP CORRECTED): 52 (ref 71–104)
PO2: 256 MMHG (ref 71–104)
PO2: 53 MMHG (ref 71–104)
PO2: 58 MMHG (ref 71–104)
PO2: 67 MMHG (ref 71–104)
POIKILOCYTES: SLIGHT
POTASSIUM REFLEX MAGNESIUM: 5.9 MEQ/L (ref 3.5–5.2)
POTASSIUM REFLEX MAGNESIUM: 7.1 MEQ/L (ref 3.5–5.2)
POTASSIUM SERPL-SCNC: 4.2 MEQ/L (ref 3.5–5.2)
POTASSIUM SERPL-SCNC: 4.5 MEQ/L (ref 3.5–5.2)
POTASSIUM SERPL-SCNC: 4.6 MEQ/L (ref 3.5–5.2)
POTASSIUM SERPL-SCNC: 4.8 MEQ/L (ref 3.5–5.2)
POTASSIUM SERPL-SCNC: 5.9 MEQ/L (ref 3.5–5.2)
POTASSIUM SERPL-SCNC: 6.5 MEQ/L (ref 3.5–5.2)
PREALBUMIN: 17 MG/DL (ref 20–40)
PRO-BNP: 4134 PG/ML (ref 0–1800)
PROCALCITONIN: 0.47 NG/ML (ref 0.01–0.09)
PROTEIN UA: 300
RBC # BLD: 2.14 MILL/MM3 (ref 4.7–6.1)
RBC # BLD: 2.17 MILL/MM3 (ref 4.7–6.1)
RBC # BLD: 2.18 MILL/MM3 (ref 4.7–6.1)
RBC # BLD: 2.36 MILL/MM3 (ref 4.7–6.1)
RBC URINE: ABNORMAL /HPF
RENAL EPITHELIAL, UA: ABNORMAL
RH FACTOR: NORMAL
SEG NEUTROPHILS: 75 %
SEGMENTED NEUTROPHILS ABSOLUTE COUNT: 3.3 THOU/MM3 (ref 1.8–7.7)
SET PEEP: 10 MMHG
SET PEEP: 5 MMHG
SET PEEP: 5 MMHG
SET PRESS SUPP: 5 CMH2O
SET RESPIRATORY RATE: 16 BPM
SET RESPIRATORY RATE: 22 BPM
SET TIDAL VOLUME: 450 ML
SODIUM BLD-SCNC: 130 MEQ/L (ref 135–145)
SODIUM BLD-SCNC: 131 MEQ/L (ref 135–145)
SODIUM BLD-SCNC: 132 MEQ/L (ref 135–145)
SODIUM BLD-SCNC: 145 MEQ/L (ref 135–145)
SODIUM BLD-SCNC: 145 MEQ/L (ref 135–145)
SODIUM BLD-SCNC: 146 MEQ/L (ref 135–145)
SODIUM BLD-SCNC: 146 MEQ/L (ref 135–145)
SOURCE, BLOOD GAS: ABNORMAL
SPECIFIC GRAVITY, URINE: 1.02 (ref 1–1.03)
TARGET CELLS: ABNORMAL
TEMPERATURE: 33
TEMPERATURE: 33.1
TOTAL PROTEIN: 5 G/DL (ref 6.1–8)
TOTAL PROTEIN: 5.5 G/DL (ref 6.1–8)
TROPONIN T: 0.07 NG/ML
TROPONIN T: 0.08 NG/ML
TROPONIN T: 0.11 NG/ML
URINE CULTURE REFLEX: ABNORMAL
UROBILINOGEN, URINE: 1 EU/DL
VITAMIN D 25-HYDROXY: 46 NG/ML (ref 30–100)
WBC # BLD: 4.1 THOU/MM3 (ref 4.8–10.8)
WBC # BLD: 4.4 THOU/MM3 (ref 4.8–10.8)
WBC # BLD: 4.8 THOU/MM3 (ref 4.8–10.8)
WBC # BLD: 5 THOU/MM3 (ref 4.8–10.8)
WBC UA: ABNORMAL /HPF
YEAST: ABNORMAL

## 2018-01-01 PROCEDURE — 36415 COLL VENOUS BLD VENIPUNCTURE: CPT

## 2018-01-01 PROCEDURE — 71045 X-RAY EXAM CHEST 1 VIEW: CPT

## 2018-01-01 PROCEDURE — 1290000000 HC SEMI PRIVATE OTHER R&B

## 2018-01-01 PROCEDURE — 6360000002 HC RX W HCPCS: Performed by: INTERNAL MEDICINE

## 2018-01-01 PROCEDURE — 6360000002 HC RX W HCPCS

## 2018-01-01 PROCEDURE — A6257 TRANSPARENT FILM <= 16 SQ IN: HCPCS

## 2018-01-01 PROCEDURE — 81001 URINALYSIS AUTO W/SCOPE: CPT

## 2018-01-01 PROCEDURE — 85610 PROTHROMBIN TIME: CPT

## 2018-01-01 PROCEDURE — 2580000003 HC RX 258: Performed by: EMERGENCY MEDICINE

## 2018-01-01 PROCEDURE — 84484 ASSAY OF TROPONIN QUANT: CPT

## 2018-01-01 PROCEDURE — 80048 BASIC METABOLIC PNL TOTAL CA: CPT

## 2018-01-01 PROCEDURE — 74176 CT ABD & PELVIS W/O CONTRAST: CPT

## 2018-01-01 PROCEDURE — 72125 CT NECK SPINE W/O DYE: CPT

## 2018-01-01 PROCEDURE — 86850 RBC ANTIBODY SCREEN: CPT

## 2018-01-01 PROCEDURE — 6370000000 HC RX 637 (ALT 250 FOR IP): Performed by: FAMILY MEDICINE

## 2018-01-01 PROCEDURE — 82947 ASSAY GLUCOSE BLOOD QUANT: CPT

## 2018-01-01 PROCEDURE — 87147 CULTURE TYPE IMMUNOLOGIC: CPT

## 2018-01-01 PROCEDURE — 83605 ASSAY OF LACTIC ACID: CPT

## 2018-01-01 PROCEDURE — 99223 1ST HOSP IP/OBS HIGH 75: CPT | Performed by: INTERNAL MEDICINE

## 2018-01-01 PROCEDURE — 2100000000 HC CCU R&B

## 2018-01-01 PROCEDURE — 84134 ASSAY OF PREALBUMIN: CPT

## 2018-01-01 PROCEDURE — 94640 AIRWAY INHALATION TREATMENT: CPT

## 2018-01-01 PROCEDURE — 85025 COMPLETE CBC W/AUTO DIFF WBC: CPT

## 2018-01-01 PROCEDURE — 99238 HOSP IP/OBS DSCHRG MGMT 30/<: CPT | Performed by: INTERNAL MEDICINE

## 2018-01-01 PROCEDURE — 85027 COMPLETE CBC AUTOMATED: CPT

## 2018-01-01 PROCEDURE — 6360000002 HC RX W HCPCS: Performed by: EMERGENCY MEDICINE

## 2018-01-01 PROCEDURE — 36600 WITHDRAWAL OF ARTERIAL BLOOD: CPT

## 2018-01-01 PROCEDURE — 82948 REAGENT STRIP/BLOOD GLUCOSE: CPT

## 2018-01-01 PROCEDURE — 99285 EMERGENCY DEPT VISIT HI MDM: CPT

## 2018-01-01 PROCEDURE — 6370000000 HC RX 637 (ALT 250 FOR IP): Performed by: INTERNAL MEDICINE

## 2018-01-01 PROCEDURE — 87804 INFLUENZA ASSAY W/OPTIC: CPT

## 2018-01-01 PROCEDURE — 36556 INSERT NON-TUNNEL CV CATH: CPT

## 2018-01-01 PROCEDURE — 83735 ASSAY OF MAGNESIUM: CPT

## 2018-01-01 PROCEDURE — 82330 ASSAY OF CALCIUM: CPT

## 2018-01-01 PROCEDURE — 93010 ELECTROCARDIOGRAM REPORT: CPT | Performed by: INTERNAL MEDICINE

## 2018-01-01 PROCEDURE — 84100 ASSAY OF PHOSPHORUS: CPT

## 2018-01-01 PROCEDURE — 94003 VENT MGMT INPAT SUBQ DAY: CPT

## 2018-01-01 PROCEDURE — 6370000000 HC RX 637 (ALT 250 FOR IP): Performed by: EMERGENCY MEDICINE

## 2018-01-01 PROCEDURE — 6360000002 HC RX W HCPCS: Performed by: FAMILY MEDICINE

## 2018-01-01 PROCEDURE — 06HN33Z INSERTION OF INFUSION DEVICE INTO LEFT FEMORAL VEIN, PERCUTANEOUS APPROACH: ICD-10-PCS | Performed by: EMERGENCY MEDICINE

## 2018-01-01 PROCEDURE — 36430 TRANSFUSION BLD/BLD COMPNT: CPT

## 2018-01-01 PROCEDURE — 93005 ELECTROCARDIOGRAM TRACING: CPT | Performed by: EMERGENCY MEDICINE

## 2018-01-01 PROCEDURE — 2700000000 HC OXYGEN THERAPY PER DAY

## 2018-01-01 PROCEDURE — 2580000003 HC RX 258: Performed by: INTERNAL MEDICINE

## 2018-01-01 PROCEDURE — 80053 COMPREHEN METABOLIC PANEL: CPT

## 2018-01-01 PROCEDURE — 99222 1ST HOSP IP/OBS MODERATE 55: CPT | Performed by: INTERNAL MEDICINE

## 2018-01-01 PROCEDURE — 97535 SELF CARE MNGMENT TRAINING: CPT

## 2018-01-01 PROCEDURE — 87081 CULTURE SCREEN ONLY: CPT

## 2018-01-01 PROCEDURE — 96365 THER/PROPH/DIAG IV INF INIT: CPT

## 2018-01-01 PROCEDURE — C9113 INJ PANTOPRAZOLE SODIUM, VIA: HCPCS | Performed by: INTERNAL MEDICINE

## 2018-01-01 PROCEDURE — 97530 THERAPEUTIC ACTIVITIES: CPT

## 2018-01-01 PROCEDURE — 2720000010 HC SURG SUPPLY STERILE

## 2018-01-01 PROCEDURE — 87086 URINE CULTURE/COLONY COUNT: CPT

## 2018-01-01 PROCEDURE — 82306 VITAMIN D 25 HYDROXY: CPT

## 2018-01-01 PROCEDURE — 2500000003 HC RX 250 WO HCPCS: Performed by: INTERNAL MEDICINE

## 2018-01-01 PROCEDURE — 93005 ELECTROCARDIOGRAM TRACING: CPT | Performed by: INTERNAL MEDICINE

## 2018-01-01 PROCEDURE — 85730 THROMBOPLASTIN TIME PARTIAL: CPT

## 2018-01-01 PROCEDURE — 71250 CT THORAX DX C-: CPT

## 2018-01-01 PROCEDURE — 82803 BLOOD GASES ANY COMBINATION: CPT

## 2018-01-01 PROCEDURE — 94002 VENT MGMT INPAT INIT DAY: CPT

## 2018-01-01 PROCEDURE — P9045 ALBUMIN (HUMAN), 5%, 250 ML: HCPCS | Performed by: INTERNAL MEDICINE

## 2018-01-01 PROCEDURE — 87641 MR-STAPH DNA AMP PROBE: CPT

## 2018-01-01 PROCEDURE — 2500000003 HC RX 250 WO HCPCS: Performed by: EMERGENCY MEDICINE

## 2018-01-01 PROCEDURE — P9016 RBC LEUKOCYTES REDUCED: HCPCS

## 2018-01-01 PROCEDURE — 2500000003 HC RX 250 WO HCPCS

## 2018-01-01 PROCEDURE — 83690 ASSAY OF LIPASE: CPT

## 2018-01-01 PROCEDURE — 84145 PROCALCITONIN (PCT): CPT

## 2018-01-01 PROCEDURE — 70450 CT HEAD/BRAIN W/O DYE: CPT

## 2018-01-01 PROCEDURE — 83880 ASSAY OF NATRIURETIC PEPTIDE: CPT

## 2018-01-01 PROCEDURE — 86901 BLOOD TYPING SEROLOGIC RH(D): CPT

## 2018-01-01 PROCEDURE — 86900 BLOOD TYPING SEROLOGIC ABO: CPT

## 2018-01-01 PROCEDURE — 87040 BLOOD CULTURE FOR BACTERIA: CPT

## 2018-01-01 PROCEDURE — 86923 COMPATIBILITY TEST ELECTRIC: CPT

## 2018-01-01 PROCEDURE — S0028 INJECTION, FAMOTIDINE, 20 MG: HCPCS | Performed by: INTERNAL MEDICINE

## 2018-01-01 PROCEDURE — APPSS180 APP SPLIT SHARED TIME > 60 MINUTES: Performed by: NURSE PRACTITIONER

## 2018-01-01 PROCEDURE — 96368 THER/DIAG CONCURRENT INF: CPT

## 2018-01-01 RX ORDER — CHLORHEXIDINE GLUCONATE 0.12 MG/ML
15 RINSE ORAL 2 TIMES DAILY
Status: DISCONTINUED | OUTPATIENT
Start: 2018-01-01 | End: 2018-01-01 | Stop reason: HOSPADM

## 2018-01-01 RX ORDER — FOLIC ACID 1 MG/1
1 TABLET ORAL DAILY
COMMUNITY
Start: 2018-01-01

## 2018-01-01 RX ORDER — SODIUM POLYSTYRENE SULFONATE 15 G/60ML
15 SUSPENSION ORAL; RECTAL ONCE
Status: COMPLETED | OUTPATIENT
Start: 2018-01-01 | End: 2018-01-01

## 2018-01-01 RX ORDER — ACETAMINOPHEN 325 MG/1
650 TABLET ORAL EVERY 4 HOURS PRN
COMMUNITY
Start: 2018-01-01

## 2018-01-01 RX ORDER — DEXTROSE MONOHYDRATE 25 G/50ML
12.5 INJECTION, SOLUTION INTRAVENOUS PRN
Status: DISCONTINUED | OUTPATIENT
Start: 2018-01-01 | End: 2018-01-01 | Stop reason: SDUPTHER

## 2018-01-01 RX ORDER — VANCOMYCIN HYDROCHLORIDE 1 G/200ML
1000 INJECTION, SOLUTION INTRAVENOUS EVERY 12 HOURS
Status: DISCONTINUED | OUTPATIENT
Start: 2018-01-01 | End: 2018-01-01 | Stop reason: SDUPTHER

## 2018-01-01 RX ORDER — ALBUMIN, HUMAN INJ 5% 5 %
25 SOLUTION INTRAVENOUS ONCE
Status: DISCONTINUED | OUTPATIENT
Start: 2018-01-01 | End: 2018-01-01 | Stop reason: SDUPTHER

## 2018-01-01 RX ORDER — 0.9 % SODIUM CHLORIDE 0.9 %
500 INTRAVENOUS SOLUTION INTRAVENOUS ONCE
Status: COMPLETED | OUTPATIENT
Start: 2018-01-01 | End: 2018-01-01

## 2018-01-01 RX ORDER — DEXTROSE MONOHYDRATE 25 G/50ML
25 INJECTION, SOLUTION INTRAVENOUS ONCE
Status: COMPLETED | OUTPATIENT
Start: 2018-01-01 | End: 2018-01-01

## 2018-01-01 RX ORDER — MEGESTROL ACETATE 40 MG/ML
200 SUSPENSION ORAL
Qty: 240 ML | Refills: 3
Start: 2018-01-01

## 2018-01-01 RX ORDER — NICOTINE POLACRILEX 4 MG
15 LOZENGE BUCCAL PRN
Status: DISCONTINUED | OUTPATIENT
Start: 2018-01-01 | End: 2018-01-01 | Stop reason: HOSPADM

## 2018-01-01 RX ORDER — ONDANSETRON 2 MG/ML
4 INJECTION INTRAMUSCULAR; INTRAVENOUS EVERY 6 HOURS PRN
Status: DISCONTINUED | OUTPATIENT
Start: 2018-01-01 | End: 2018-01-01 | Stop reason: HOSPADM

## 2018-01-01 RX ORDER — CALCIUM GLUCONATE 94 MG/ML
INJECTION, SOLUTION INTRAVENOUS
Status: COMPLETED
Start: 2018-01-01 | End: 2018-01-01

## 2018-01-01 RX ORDER — DRONABINOL 2.5 MG/1
2.5 CAPSULE ORAL 2 TIMES DAILY
Qty: 60 CAPSULE | Refills: 0 | Status: SHIPPED | OUTPATIENT
Start: 2018-01-01 | End: 2018-01-01 | Stop reason: HOSPADM

## 2018-01-01 RX ORDER — DEXTROSE MONOHYDRATE 25 G/50ML
12.5 INJECTION, SOLUTION INTRAVENOUS PRN
Status: DISCONTINUED | OUTPATIENT
Start: 2018-01-01 | End: 2018-01-01 | Stop reason: HOSPADM

## 2018-01-01 RX ORDER — ALBUMIN, HUMAN INJ 5% 5 %
12.5 SOLUTION INTRAVENOUS ONCE
Status: DISCONTINUED | OUTPATIENT
Start: 2018-01-01 | End: 2018-01-01 | Stop reason: HOSPADM

## 2018-01-01 RX ORDER — VANCOMYCIN HYDROCHLORIDE 1 G/200ML
1000 INJECTION, SOLUTION INTRAVENOUS ONCE
Status: COMPLETED | OUTPATIENT
Start: 2018-01-01 | End: 2018-01-01

## 2018-01-01 RX ORDER — LIDOCAINE HYDROCHLORIDE AND EPINEPHRINE BITARTRATE 20; .01 MG/ML; MG/ML
20 INJECTION, SOLUTION SUBCUTANEOUS ONCE
Status: DISCONTINUED | OUTPATIENT
Start: 2018-01-01 | End: 2018-01-01

## 2018-01-01 RX ORDER — DEXTROSE MONOHYDRATE 25 G/50ML
25 INJECTION, SOLUTION INTRAVENOUS PRN
Status: DISCONTINUED | OUTPATIENT
Start: 2018-01-01 | End: 2018-01-01 | Stop reason: SDUPTHER

## 2018-01-01 RX ORDER — CLONIDINE HYDROCHLORIDE 0.1 MG/1
0.1 TABLET ORAL 2 TIMES DAILY
Qty: 60 TABLET | Refills: 0 | Status: SHIPPED | OUTPATIENT
Start: 2018-01-01 | End: 2018-01-01 | Stop reason: HOSPADM

## 2018-01-01 RX ORDER — GLYCOPYRROLATE 0.2 MG/ML
0.2 INJECTION INTRAMUSCULAR; INTRAVENOUS
Status: DISCONTINUED | OUTPATIENT
Start: 2018-01-01 | End: 2018-01-01 | Stop reason: HOSPADM

## 2018-01-01 RX ORDER — SODIUM POLYSTYRENE SULFONATE 15 G/60ML
30 SUSPENSION ORAL; RECTAL ONCE
Status: COMPLETED | OUTPATIENT
Start: 2018-01-01 | End: 2018-01-01

## 2018-01-01 RX ORDER — FENTANYL CITRATE 50 UG/ML
25 INJECTION, SOLUTION INTRAMUSCULAR; INTRAVENOUS
Status: DISCONTINUED | OUTPATIENT
Start: 2018-01-01 | End: 2018-01-01 | Stop reason: HOSPADM

## 2018-01-01 RX ORDER — FERROUS SULFATE 325(65) MG
325 TABLET ORAL 2 TIMES DAILY WITH MEALS
Status: DISCONTINUED | OUTPATIENT
Start: 2018-01-01 | End: 2018-01-01 | Stop reason: HOSPADM

## 2018-01-01 RX ORDER — CHLORHEXIDINE GLUCONATE 0.12 MG/ML
15 RINSE ORAL 2 TIMES DAILY
Status: DISCONTINUED | OUTPATIENT
Start: 2018-01-01 | End: 2018-01-01 | Stop reason: SDUPTHER

## 2018-01-01 RX ORDER — GINSENG 100 MG
CAPSULE ORAL
Qty: 14 G | Refills: 0 | Status: SHIPPED | OUTPATIENT
Start: 2018-01-01 | End: 2018-01-01

## 2018-01-01 RX ORDER — ACETAMINOPHEN 325 MG/1
650 TABLET ORAL EVERY 4 HOURS PRN
Status: DISCONTINUED | OUTPATIENT
Start: 2018-01-01 | End: 2018-01-01 | Stop reason: HOSPADM

## 2018-01-01 RX ORDER — 0.9 % SODIUM CHLORIDE 0.9 %
250 INTRAVENOUS SOLUTION INTRAVENOUS ONCE
Status: DISCONTINUED | OUTPATIENT
Start: 2018-01-01 | End: 2018-01-01 | Stop reason: HOSPADM

## 2018-01-01 RX ORDER — MIDAZOLAM HYDROCHLORIDE 1 MG/ML
2 INJECTION INTRAMUSCULAR; INTRAVENOUS EVERY 30 MIN PRN
Status: DISCONTINUED | OUTPATIENT
Start: 2018-01-01 | End: 2018-01-01 | Stop reason: HOSPADM

## 2018-01-01 RX ORDER — PANTOPRAZOLE SODIUM 40 MG/10ML
40 INJECTION, POWDER, LYOPHILIZED, FOR SOLUTION INTRAVENOUS DAILY
Status: DISCONTINUED | OUTPATIENT
Start: 2018-01-01 | End: 2018-01-01

## 2018-01-01 RX ORDER — FLUCONAZOLE 2 MG/ML
200 INJECTION, SOLUTION INTRAVENOUS EVERY 24 HOURS
Status: DISCONTINUED | OUTPATIENT
Start: 2018-01-01 | End: 2018-01-01 | Stop reason: HOSPADM

## 2018-01-01 RX ORDER — CALCIUM GLUCONATE 94 MG/ML
1 INJECTION, SOLUTION INTRAVENOUS ONCE
Status: COMPLETED | OUTPATIENT
Start: 2018-01-01 | End: 2018-01-01

## 2018-01-01 RX ORDER — HEPARIN SODIUM 5000 [USP'U]/ML
5000 INJECTION, SOLUTION INTRAVENOUS; SUBCUTANEOUS 2 TIMES DAILY
Status: DISCONTINUED | OUTPATIENT
Start: 2018-01-01 | End: 2018-01-01 | Stop reason: HOSPADM

## 2018-01-01 RX ORDER — ALBUTEROL SULFATE 90 UG/1
2 AEROSOL, METERED RESPIRATORY (INHALATION) EVERY 4 HOURS PRN
Status: DISCONTINUED | OUTPATIENT
Start: 2018-01-01 | End: 2018-01-01 | Stop reason: HOSPADM

## 2018-01-01 RX ORDER — DEXTROSE AND SODIUM CHLORIDE 5; .9 G/100ML; G/100ML
100 INJECTION, SOLUTION INTRAVENOUS PRN
Status: DISCONTINUED | OUTPATIENT
Start: 2018-01-01 | End: 2018-01-01 | Stop reason: HOSPADM

## 2018-01-01 RX ORDER — ONDANSETRON 2 MG/ML
4 INJECTION INTRAMUSCULAR; INTRAVENOUS EVERY 6 HOURS PRN
Status: DISCONTINUED | OUTPATIENT
Start: 2018-01-01 | End: 2018-01-01 | Stop reason: SDUPTHER

## 2018-01-01 RX ORDER — SODIUM CHLORIDE 9 MG/ML
INJECTION, SOLUTION INTRAVENOUS CONTINUOUS
Status: DISCONTINUED | OUTPATIENT
Start: 2018-01-01 | End: 2018-01-01

## 2018-01-01 RX ORDER — DOXYCYCLINE HYCLATE 100 MG/1
100 CAPSULE ORAL 2 TIMES DAILY
Qty: 28 CAPSULE | Refills: 0 | Status: SHIPPED | OUTPATIENT
Start: 2018-01-01 | End: 2018-01-01

## 2018-01-01 RX ORDER — MORPHINE SULFATE 2 MG/ML
2 INJECTION, SOLUTION INTRAMUSCULAR; INTRAVENOUS
Status: DISCONTINUED | OUTPATIENT
Start: 2018-01-01 | End: 2018-01-01 | Stop reason: HOSPADM

## 2018-01-01 RX ORDER — LIDOCAINE HYDROCHLORIDE AND EPINEPHRINE BITARTRATE 20; .01 MG/ML; MG/ML
20 INJECTION, SOLUTION SUBCUTANEOUS ONCE
Status: DISCONTINUED | OUTPATIENT
Start: 2018-01-01 | End: 2018-01-01 | Stop reason: HOSPADM

## 2018-01-01 RX ORDER — MIDAZOLAM HYDROCHLORIDE 1 MG/ML
1 INJECTION INTRAMUSCULAR; INTRAVENOUS EVERY 30 MIN PRN
Status: DISCONTINUED | OUTPATIENT
Start: 2018-01-01 | End: 2018-01-01 | Stop reason: HOSPADM

## 2018-01-01 RX ORDER — FERROUS SULFATE 325(65) MG
325 TABLET ORAL 2 TIMES DAILY WITH MEALS
COMMUNITY
Start: 2018-01-01

## 2018-01-01 RX ORDER — TAMSULOSIN HYDROCHLORIDE 0.4 MG/1
0.8 CAPSULE ORAL DAILY
Qty: 30 CAPSULE | Refills: 0 | Status: SHIPPED | OUTPATIENT
Start: 2018-01-01

## 2018-01-01 RX ORDER — IPRATROPIUM BROMIDE AND ALBUTEROL SULFATE 2.5; .5 MG/3ML; MG/3ML
1 SOLUTION RESPIRATORY (INHALATION) EVERY 4 HOURS
Status: DISCONTINUED | OUTPATIENT
Start: 2018-01-01 | End: 2018-01-01 | Stop reason: HOSPADM

## 2018-01-01 RX ADMIN — Medication 250 MG: at 08:53

## 2018-01-01 RX ADMIN — Medication 10 ML: at 21:32

## 2018-01-01 RX ADMIN — ENOXAPARIN SODIUM 30 MG: 40 INJECTION SUBCUTANEOUS at 09:30

## 2018-01-01 RX ADMIN — PANTOPRAZOLE SODIUM 40 MG: 40 TABLET, DELAYED RELEASE ORAL at 10:20

## 2018-01-01 RX ADMIN — FUROSEMIDE 40 MG: 40 TABLET ORAL at 08:50

## 2018-01-01 RX ADMIN — FOLIC ACID 1 MG: 1 TABLET ORAL at 08:17

## 2018-01-01 RX ADMIN — Medication 2 PUFF: at 04:23

## 2018-01-01 RX ADMIN — SODIUM CHLORIDE 500 ML: 9 INJECTION, SOLUTION INTRAVENOUS at 17:12

## 2018-01-01 RX ADMIN — VITAMIN D, TAB 1000IU (100/BT) 5000 UNITS: 25 TAB at 10:22

## 2018-01-01 RX ADMIN — Medication 10000 UNITS: at 09:27

## 2018-01-01 RX ADMIN — Medication 20 MCG/MIN: at 17:18

## 2018-01-01 RX ADMIN — Medication 15 G: at 02:15

## 2018-01-01 RX ADMIN — FOLIC ACID 1 MG: 1 TABLET ORAL at 08:50

## 2018-01-01 RX ADMIN — Medication 10 ML: at 21:28

## 2018-01-01 RX ADMIN — Medication 10 ML: at 09:27

## 2018-01-01 RX ADMIN — SODIUM CHLORIDE 500 ML: 9 INJECTION, SOLUTION INTRAVENOUS at 18:09

## 2018-01-01 RX ADMIN — TAMSULOSIN HYDROCHLORIDE 0.8 MG: 0.4 CAPSULE ORAL at 10:19

## 2018-01-01 RX ADMIN — Medication 50 MCG/HR: at 04:54

## 2018-01-01 RX ADMIN — PANTOPRAZOLE SODIUM 40 MG: 40 TABLET, DELAYED RELEASE ORAL at 08:17

## 2018-01-01 RX ADMIN — DRONABINOL 2.5 MG: 2.5 CAPSULE ORAL at 09:30

## 2018-01-01 RX ADMIN — FERROUS SULFATE TAB 325 MG (65 MG ELEMENTAL FE) 325 MG: 325 (65 FE) TAB at 10:22

## 2018-01-01 RX ADMIN — Medication 15 G: at 08:33

## 2018-01-01 RX ADMIN — THERA TABS 1 TABLET: TAB at 10:47

## 2018-01-01 RX ADMIN — Medication 2 PUFF: at 07:45

## 2018-01-01 RX ADMIN — FAMOTIDINE 20 MG: 10 INJECTION, SOLUTION INTRAVENOUS at 08:36

## 2018-01-01 RX ADMIN — METOPROLOL TARTRATE 25 MG: 25 TABLET ORAL at 21:23

## 2018-01-01 RX ADMIN — METOPROLOL TARTRATE 25 MG: 25 TABLET ORAL at 10:21

## 2018-01-01 RX ADMIN — PREDNISONE 5 MG: 5 TABLET ORAL at 10:47

## 2018-01-01 RX ADMIN — VANCOMYCIN HYDROCHLORIDE 1000 MG: 1 INJECTION, SOLUTION INTRAVENOUS at 18:04

## 2018-01-01 RX ADMIN — Medication 250 MG: at 10:19

## 2018-01-01 RX ADMIN — DRONABINOL 2.5 MG: 2.5 CAPSULE ORAL at 08:51

## 2018-01-01 RX ADMIN — NOREPINEPHRINE BITARTRATE 30 MCG/MIN: 1 INJECTION INTRAVENOUS at 04:51

## 2018-01-01 RX ADMIN — HYOSCYAMINE SULFATE: 16 SOLUTION at 08:10

## 2018-01-01 RX ADMIN — FERROUS SULFATE TAB 325 MG (65 MG ELEMENTAL FE) 325 MG: 325 (65 FE) TAB at 18:39

## 2018-01-01 RX ADMIN — Medication 250 MG: at 10:47

## 2018-01-01 RX ADMIN — PHENYLEPHRINE HYDROCHLORIDE 100 MCG/MIN: 10 INJECTION INTRAVENOUS at 22:17

## 2018-01-01 RX ADMIN — FOLIC ACID 1 MG: 1 TABLET ORAL at 10:22

## 2018-01-01 RX ADMIN — Medication 70 MCG/MIN: at 22:09

## 2018-01-01 RX ADMIN — FUROSEMIDE 40 MG: 40 TABLET ORAL at 08:17

## 2018-01-01 RX ADMIN — METOPROLOL TARTRATE 25 MG: 25 TABLET ORAL at 08:17

## 2018-01-01 RX ADMIN — INSULIN HUMAN 4 UNITS: 100 INJECTION, SOLUTION PARENTERAL at 21:50

## 2018-01-01 RX ADMIN — DRONABINOL 2.5 MG: 2.5 CAPSULE ORAL at 20:31

## 2018-01-01 RX ADMIN — TAMSULOSIN HYDROCHLORIDE 0.8 MG: 0.4 CAPSULE ORAL at 08:17

## 2018-01-01 RX ADMIN — SODIUM BICARBONATE 50 MEQ: 84 INJECTION, SOLUTION INTRAVENOUS at 21:21

## 2018-01-01 RX ADMIN — PANTOPRAZOLE SODIUM 40 MG: 40 TABLET, DELAYED RELEASE ORAL at 08:51

## 2018-01-01 RX ADMIN — GLYCOPYRROLATE 0.2 MG: 0.2 INJECTION, SOLUTION INTRAMUSCULAR; INTRAVENOUS at 11:44

## 2018-01-01 RX ADMIN — ENOXAPARIN SODIUM 30 MG: 40 INJECTION SUBCUTANEOUS at 10:48

## 2018-01-01 RX ADMIN — Medication 2 PUFF: at 01:06

## 2018-01-01 RX ADMIN — MIDAZOLAM 1 MG/HR: 5 INJECTION INTRAMUSCULAR; INTRAVENOUS at 21:51

## 2018-01-01 RX ADMIN — TAMSULOSIN HYDROCHLORIDE 0.8 MG: 0.4 CAPSULE ORAL at 09:25

## 2018-01-01 RX ADMIN — Medication 100 MEQ: at 17:26

## 2018-01-01 RX ADMIN — MORPHINE SULFATE 2 MG: 2 INJECTION, SOLUTION INTRAMUSCULAR; INTRAVENOUS at 11:43

## 2018-01-01 RX ADMIN — PANTOPRAZOLE SODIUM 40 MG: 40 TABLET, DELAYED RELEASE ORAL at 09:25

## 2018-01-01 RX ADMIN — DRONABINOL 2.5 MG: 2.5 CAPSULE ORAL at 21:23

## 2018-01-01 RX ADMIN — Medication 25 MCG/HR: at 21:30

## 2018-01-01 RX ADMIN — Medication 2 PUFF: at 22:37

## 2018-01-01 RX ADMIN — Medication 2 PUFF: at 10:11

## 2018-01-01 RX ADMIN — DRONABINOL 2.5 MG: 2.5 CAPSULE ORAL at 21:32

## 2018-01-01 RX ADMIN — Medication 10 ML: at 20:32

## 2018-01-01 RX ADMIN — ENOXAPARIN SODIUM 40 MG: 40 INJECTION SUBCUTANEOUS at 08:31

## 2018-01-01 RX ADMIN — Medication 2 PUFF: at 13:37

## 2018-01-01 RX ADMIN — Medication 10000 UNITS: at 10:24

## 2018-01-01 RX ADMIN — Medication 2 PUFF: at 17:07

## 2018-01-01 RX ADMIN — PANTOPRAZOLE SODIUM 40 MG: 40 TABLET, DELAYED RELEASE ORAL at 10:47

## 2018-01-01 RX ADMIN — FOLIC ACID 1 MG: 1 TABLET ORAL at 10:47

## 2018-01-01 RX ADMIN — TAZOBACTAM SODIUM AND PIPERACILLIN SODIUM 3.38 G: 375; 3 INJECTION, SOLUTION INTRAVENOUS at 03:09

## 2018-01-01 RX ADMIN — VANCOMYCIN HYDROCHLORIDE 1000 MG: 1 INJECTION, SOLUTION INTRAVENOUS at 19:42

## 2018-01-01 RX ADMIN — Medication 2 PUFF: at 18:22

## 2018-01-01 RX ADMIN — Medication 2 PUFF: at 06:25

## 2018-01-01 RX ADMIN — DRONABINOL 2.5 MG: 2.5 CAPSULE ORAL at 21:34

## 2018-01-01 RX ADMIN — Medication 2.4 UNITS/HR: at 22:15

## 2018-01-01 RX ADMIN — PREDNISONE 5 MG: 5 TABLET ORAL at 10:20

## 2018-01-01 RX ADMIN — METOPROLOL TARTRATE 25 MG: 25 TABLET ORAL at 21:32

## 2018-01-01 RX ADMIN — CLONIDINE HYDROCHLORIDE 0.1 MG: 0.1 TABLET ORAL at 21:32

## 2018-01-01 RX ADMIN — FERROUS SULFATE TAB 325 MG (65 MG ELEMENTAL FE) 325 MG: 325 (65 FE) TAB at 09:25

## 2018-01-01 RX ADMIN — FUROSEMIDE 40 MG: 40 TABLET ORAL at 09:25

## 2018-01-01 RX ADMIN — Medication 250 MG: at 08:18

## 2018-01-01 RX ADMIN — CLONIDINE HYDROCHLORIDE 0.1 MG: 0.1 TABLET ORAL at 10:46

## 2018-01-01 RX ADMIN — TAZOBACTAM SODIUM AND PIPERACILLIN SODIUM 3.38 G: 375; 3 INJECTION, SOLUTION INTRAVENOUS at 19:42

## 2018-01-01 RX ADMIN — Medication 2 PUFF: at 19:29

## 2018-01-01 RX ADMIN — FERROUS SULFATE TAB 325 MG (65 MG ELEMENTAL FE) 325 MG: 325 (65 FE) TAB at 18:04

## 2018-01-01 RX ADMIN — Medication 2 PUFF: at 05:54

## 2018-01-01 RX ADMIN — THERA TABS 1 TABLET: TAB at 08:51

## 2018-01-01 RX ADMIN — VITAMIN D, TAB 1000IU (100/BT) 5000 UNITS: 25 TAB at 08:18

## 2018-01-01 RX ADMIN — FLUCONAZOLE 200 MG: 2 INJECTION INTRAVENOUS at 08:33

## 2018-01-01 RX ADMIN — VANCOMYCIN HYDROCHLORIDE 1000 MG: 1 INJECTION, SOLUTION INTRAVENOUS at 18:39

## 2018-01-01 RX ADMIN — DEXTROSE MONOHYDRATE 12.5 G: 25 INJECTION, SOLUTION INTRAVENOUS at 04:28

## 2018-01-01 RX ADMIN — ENOXAPARIN SODIUM 30 MG: 40 INJECTION SUBCUTANEOUS at 10:26

## 2018-01-01 RX ADMIN — FERROUS SULFATE TAB 325 MG (65 MG ELEMENTAL FE) 325 MG: 325 (65 FE) TAB at 18:23

## 2018-01-01 RX ADMIN — CLONIDINE HYDROCHLORIDE 0.1 MG: 0.1 TABLET ORAL at 20:31

## 2018-01-01 RX ADMIN — Medication 30 G: at 22:05

## 2018-01-01 RX ADMIN — FERROUS SULFATE TAB 325 MG (65 MG ELEMENTAL FE) 325 MG: 325 (65 FE) TAB at 10:47

## 2018-01-01 RX ADMIN — Medication 10 ML: at 10:49

## 2018-01-01 RX ADMIN — CLONIDINE HYDROCHLORIDE 0.1 MG: 0.1 TABLET ORAL at 08:17

## 2018-01-01 RX ADMIN — Medication 2 MCG/KG/MIN: at 22:18

## 2018-01-01 RX ADMIN — Medication 10 ML: at 08:19

## 2018-01-01 RX ADMIN — Medication 10000 UNITS: at 10:47

## 2018-01-01 RX ADMIN — VITAMIN D, TAB 1000IU (100/BT) 5000 UNITS: 25 TAB at 08:50

## 2018-01-01 RX ADMIN — HYOSCYAMINE SULFATE: 16 SOLUTION at 08:19

## 2018-01-01 RX ADMIN — ENOXAPARIN SODIUM 30 MG: 40 INJECTION SUBCUTANEOUS at 08:52

## 2018-01-01 RX ADMIN — Medication 45 MCG/MIN: at 18:07

## 2018-01-01 RX ADMIN — PREDNISONE 5 MG: 5 TABLET ORAL at 08:17

## 2018-01-01 RX ADMIN — Medication 2 PUFF: at 22:47

## 2018-01-01 RX ADMIN — DEXTROSE MONOHYDRATE 25 G: 25 INJECTION, SOLUTION INTRAVENOUS at 21:27

## 2018-01-01 RX ADMIN — TAMSULOSIN HYDROCHLORIDE 0.8 MG: 0.4 CAPSULE ORAL at 10:46

## 2018-01-01 RX ADMIN — Medication 60 MCG/MIN: at 18:30

## 2018-01-01 RX ADMIN — Medication 2 PUFF: at 10:21

## 2018-01-01 RX ADMIN — Medication 40 MCG/MIN: at 17:56

## 2018-01-01 RX ADMIN — VITAMIN D, TAB 1000IU (100/BT) 5000 UNITS: 25 TAB at 10:47

## 2018-01-01 RX ADMIN — METOPROLOL TARTRATE 25 MG: 25 TABLET ORAL at 08:51

## 2018-01-01 RX ADMIN — TAMSULOSIN HYDROCHLORIDE 0.8 MG: 0.4 CAPSULE ORAL at 08:51

## 2018-01-01 RX ADMIN — FERROUS SULFATE TAB 325 MG (65 MG ELEMENTAL FE) 325 MG: 325 (65 FE) TAB at 18:36

## 2018-01-01 RX ADMIN — Medication 50 MEQ: at 21:21

## 2018-01-01 RX ADMIN — CLONIDINE HYDROCHLORIDE 0.1 MG: 0.1 TABLET ORAL at 21:23

## 2018-01-01 RX ADMIN — THERA TABS 1 TABLET: TAB at 10:21

## 2018-01-01 RX ADMIN — Medication 15 ML: at 08:36

## 2018-01-01 RX ADMIN — DARBEPOETIN ALFA 100 MCG: 100 INJECTION, SOLUTION INTRAVENOUS; SUBCUTANEOUS at 09:30

## 2018-01-01 RX ADMIN — METOPROLOL TARTRATE 25 MG: 25 TABLET ORAL at 10:47

## 2018-01-01 RX ADMIN — THERA TABS 1 TABLET: TAB at 09:25

## 2018-01-01 RX ADMIN — FERROUS SULFATE TAB 325 MG (65 MG ELEMENTAL FE) 325 MG: 325 (65 FE) TAB at 08:50

## 2018-01-01 RX ADMIN — DRONABINOL 2.5 MG: 2.5 CAPSULE ORAL at 10:48

## 2018-01-01 RX ADMIN — Medication 15 ML: at 00:00

## 2018-01-01 RX ADMIN — METOPROLOL TARTRATE 25 MG: 25 TABLET ORAL at 21:34

## 2018-01-01 RX ADMIN — PANTOPRAZOLE SODIUM 40 MG: 40 INJECTION, POWDER, FOR SOLUTION INTRAVENOUS at 23:20

## 2018-01-01 RX ADMIN — METOPROLOL TARTRATE 25 MG: 25 TABLET ORAL at 20:31

## 2018-01-01 RX ADMIN — CLONIDINE HYDROCHLORIDE 0.1 MG: 0.1 TABLET ORAL at 08:49

## 2018-01-01 RX ADMIN — Medication 10000 UNITS: at 09:00

## 2018-01-01 RX ADMIN — ALBUMIN (HUMAN) 25 G: 12.5 SOLUTION INTRAVENOUS at 22:20

## 2018-01-01 RX ADMIN — THERA TABS 1 TABLET: TAB at 08:17

## 2018-01-01 RX ADMIN — CLONIDINE HYDROCHLORIDE 0.1 MG: 0.1 TABLET ORAL at 09:25

## 2018-01-01 RX ADMIN — FUROSEMIDE 40 MG: 40 TABLET ORAL at 10:47

## 2018-01-01 RX ADMIN — Medication 35 MCG/MIN: at 17:47

## 2018-01-01 RX ADMIN — DRONABINOL 2.5 MG: 2.5 CAPSULE ORAL at 10:32

## 2018-01-01 RX ADMIN — Medication 10 ML: at 09:01

## 2018-01-01 RX ADMIN — Medication 10000 UNITS: at 08:18

## 2018-01-01 RX ADMIN — Medication 10 ML: at 21:34

## 2018-01-01 RX ADMIN — Medication 55 MCG/MIN: at 18:24

## 2018-01-01 RX ADMIN — HYOSCYAMINE SULFATE: 16 SOLUTION at 09:27

## 2018-01-01 RX ADMIN — CALCIUM GLUCONATE 1 G: 94 INJECTION, SOLUTION INTRAVENOUS at 21:22

## 2018-01-01 RX ADMIN — Medication 250 MG: at 09:26

## 2018-01-01 RX ADMIN — METOPROLOL TARTRATE 25 MG: 25 TABLET ORAL at 09:25

## 2018-01-01 RX ADMIN — CLONIDINE HYDROCHLORIDE 0.1 MG: 0.1 TABLET ORAL at 21:34

## 2018-01-01 RX ADMIN — Medication 2 PUFF: at 16:00

## 2018-01-01 RX ADMIN — Medication 10 MCG/MIN: at 17:08

## 2018-01-01 RX ADMIN — VITAMIN D, TAB 1000IU (100/BT) 5000 UNITS: 25 TAB at 09:25

## 2018-01-01 RX ADMIN — FUROSEMIDE 40 MG: 40 TABLET ORAL at 10:22

## 2018-01-01 RX ADMIN — DRONABINOL 2.5 MG: 2.5 CAPSULE ORAL at 08:19

## 2018-01-01 RX ADMIN — Medication 50 MCG/MIN: at 18:15

## 2018-01-01 RX ADMIN — Medication 2 PUFF: at 07:43

## 2018-01-01 RX ADMIN — LOPERAMIDE HYDROCHLORIDE 2 MG: 2 CAPSULE ORAL at 21:23

## 2018-01-01 RX ADMIN — PREDNISONE 5 MG: 5 TABLET ORAL at 09:25

## 2018-01-01 RX ADMIN — FOLIC ACID 1 MG: 1 TABLET ORAL at 09:25

## 2018-01-01 RX ADMIN — PREDNISONE 5 MG: 5 TABLET ORAL at 08:51

## 2018-01-01 RX ADMIN — Medication 2 PUFF: at 19:59

## 2018-01-01 ASSESSMENT — PAIN SCALES - GENERAL
PAINLEVEL_OUTOF10: 0
PAINLEVEL_OUTOF10: 7
PAINLEVEL_OUTOF10: 0
PAINLEVEL_OUTOF10: 0
PAINLEVEL_OUTOF10: 1
PAINLEVEL_OUTOF10: 0

## 2018-01-01 ASSESSMENT — PULMONARY FUNCTION TESTS
PIF_VALUE: 37
PIF_VALUE: 35
PIF_VALUE: 35
PIF_VALUE: 38
PIF_VALUE: 36
PIF_VALUE: 38
PIF_VALUE: 28
PIF_VALUE: 32
PIF_VALUE: 35
PIF_VALUE: 44
PIF_VALUE: 36
PIF_VALUE: 37
PIF_VALUE: 41
PIF_VALUE: 37
PIF_VALUE: 43

## 2018-01-01 NOTE — PLAN OF CARE
Problem: Falls - Risk of  Goal: Absence of falls  Outcome: Ongoing  Pt will remain free of falls. Problem: Risk for Impaired Skin Integrity  Goal: Tissue integrity - skin and mucous membranes  Structural intactness and normal physiological function of skin and  mucous membranes. Outcome: Ongoing  Skin assessment every shift. Problem: Pain:  Goal: Pain level will decrease  Pain level will decrease   Outcome: Ongoing  Reposition frequently to alleviate pain. Goal: Control of acute pain  Control of acute pain   Outcome: Ongoing  Reposition pillows and cushion to back to alleviate the acute pain. Problem: Bleeding:  Goal: Will show no signs and symptoms of excessive bleeding  Will show no signs and symptoms of excessive bleeding   Outcome: Ongoing  Pt will have no active bleeding.       Problem: Infection - Methicillin-Resistant Staphylococcus Aureus Infection:  Goal: Absence of methicillin-resistant Staphylococcus aureus infection  Absence of methicillin-resistant Staphylococcus aureus infection   Outcome: Ongoing  Follow isolation precautions

## 2018-01-01 NOTE — PROGRESS NOTES
Patient: Evelio Dural  Unit/Bed: 4O-88/228-F  YOB: 1933  MRN: [id number] Acct: [de-identified]   Admitting Diagnosis: Thoracic spine/upper back cellulitis  Admit Date:  12/22/2017  Hospital Day: 9    Assessment:     Active Problems:    Acute kidney injury ([address]    Cellulitis of back except buttock    Hyperkalemia    Vitamin D deficiency    MRSA (methicillin resistant staph aureus) culture positive    Chronic respiratory failure (HCC)    Chronic CHF (congestive heart failure) (HCC)    Superficial venous thrombosis of left arm      Plan:     Try some marinol  Continue therapies        Subjective:     Patient has no complaint of CP, SOB, but poor appetite and continued back pain   Medication side effects: none    Scheduled Meds:   dronabinol  2.5 mg Oral BID    vancomycin  1,000 mg Intravenous Q48H    sodium hypochlorite   Irrigation Daily    vitamin D  5,000 Units Oral Daily    enoxaparin  40 mg Subcutaneous Daily    sodium chloride flush  10 mL Intravenous 2 times per day    cloNIDine  0.1 mg Oral BID    darbepoetin gal-polysorbate  100 mcg Subcutaneous Weekly    folic acid  1 mg Oral Daily    furosemide  40 mg Oral Daily    metoprolol tartrate  25 mg Oral BID    mometasone-formoterol  2 puff Inhalation BID    multivitamin  1 tablet Oral Daily    pantoprazole  40 mg Oral Daily    predniSONE  5 mg Oral Daily    tamsulosin  0.8 mg Oral Daily    [START ON 12/19/2018] vancomycin (VANCOCIN) intermittent dosing (placeholder)   Other RX Placeholder    vitamin A  10,000 Units Oral Daily    vitamin C  250 mg Oral Daily    ppd   Does not apply Weekly     Continuous Infusions:   bacitracin       PRN Meds:loperamide, lidocaine, traMADol **OR** traMADol, acetaminophen, magnesium hydroxide, ondansetron, sodium chloride flush, albuterol sulfate HFA, bacitracin    Review of Systems  Pertinent items are noted in HPI.     Objective:     Patient Vitals for the past 8 hrs:   BP Pulse Resp SpO2 12/31/17 2225 120/69 100 21 96 %     I/O last 3 completed shifts: In: 80 [P.O.:780]  Out: 500 [Urine:500]  No intake/output data recorded. /69   Pulse 100   Temp 97.9 °F (36.6 °C)   Resp 21   Ht 5' 8\" (1.727 m)   Wt 129 lb 4.8 oz (58.7 kg)   SpO2 96%   BMI 19.66 kg/m²     /69   Pulse 100   Temp 97.9 °F (36.6 °C)   Resp 21   Ht 5' 8\" (1.727 m)   Wt 129 lb 4.8 oz (58.7 kg)   SpO2 96%   BMI 19.66 kg/m²   General appearance: alert, appears stated age and cooperative  Lungs: clear to auscultation bilaterally  Heart: regular rate and rhythm, S1, S2 normal, no murmur, click, rub or gallop  Abdomen: soft, non-tender; bowel sounds normal; no masses,  no organomegaly  Extremities: extremities normal, atraumatic, no cyanosis or edema  Skin: Skin color, texture, turgor normal. No rashes or lesions  Neurologic: Grossly normal      Data Review:   Results for Deb Ortiz (MRN [id number]) as of 1/1/2018 00:02   Ref.  Range 12/29/2017 06:52 12/29/2017 09:36 12/30/2017 05:22 12/31/2017 06:00   Sodium Latest Ref Range: 135 - 145 meq/L 141  143 145   Potassium Latest Ref Range: 3.5 - 5.2 meq/L 3.8  4.3 3.9   Chloride Latest Ref Range: 98 - 111 meq/L 96 (L)  95 (L) 96 (L)   CO2 Latest Ref Range: 23 - 33 meq/L 40 (HH)  41 (HH) 41 (HH)   BUN Latest Ref Range: 7 - 22 mg/dL 63 (H)  79 (H) 74 (H)   Creatinine Latest Ref Range: 0.4 - 1.2 mg/dL 1.6 (H)  1.6 (H) 1.6 (H)   Anion Gap Latest Ref Range: 8.0 - 16.0 meq/L 5.0 (L)  7.0 (L) 8.0   Est, Glom Filt Rate Latest Units: ml/min/1.73m2 41 (A)  41 (A) 41 (A)   Glucose Latest Ref Range: 70 - 108 mg/dL 87  104 93   Calcium Latest Ref Range: 8.5 - 10.5 mg/dL 9.4  9.7 9.5   RHYTHM STRIP REPORT Unknown  Attch         Electronically signed by Baltazar Torres MD on 12/31/2017 at 11:59 PM

## 2018-01-02 NOTE — PROGRESS NOTES
Infectious Diseases Progress Note  Pt Name: Candida Obregon  MRN: [id number]  YOB: 1933  Admit Date: 12/22/2017  3:03 PM  Date of evaluation: 1/2/2018  Primary Care Physician: Wilian Hare MD   [id number] 57-year-old male with history of problems with his thoracic scoliosis and chronic problems with  breathing and he does sit for prolonged times in his recliner and he has developed ulcer in the thoracic spine that started in 07/2017. The patient was seen and evaluated in the past by Dr. Saud Mccain and he is referred for further evaluation and initially seen in our office on 09/25/2017. The patient also appears to have improved initially, but after a few visits over the last month, his ulcer continued to worse. The patient unfortunately sits and reclines right over the ulcer site and his scoliosis essentially is not helping it. The patient had cultures done on 12/08/2017 and that showed MRSA and the patient presented to the office with continued worsening of the cellulitis, so decision was made to admit him to the hospital for further evaluation and treatment. The patient has been noted to have problems with his elevated creatinine and also is not noted to have significant problems with anemia with hemoglobin of 6.2.   Given 2 units of PRBCs. He still continues to lean against the back. Gregory Lyn His body habitus is a major problem. Subjective: Interval History:  Patient in bed and d/w rn about wound care dressing changed      Pt/Chart review last 24 hours:  Fever No, Diarrhea No, Dyspnea No,     Objective:      Vitals: /64   Pulse 83   Temp 97.1 °F (36.2 °C)   Resp 24   Ht 5' 8\" (1.727 m)   Wt 126 lb 11.2 oz (57.5 kg)   SpO2 96%   BMI 19.26 kg/m²    HEENT: Head: Normal, normocephalic, atraumatic.   Heart: regular rate and rhythm  Lungs: clear to auscultation bilaterally  Abdomen: soft, non-tender; bowel sounds normal; no masses,  no organomegaly  Extremities: edema lower extremities with [id number] [facility], [address]       Urine:       MICRO:    Specimen: Wound Updated: 12/20/17 1008    Gram Stain Result Rare segmented neutrophils observed. No epithelial cells observed. Many gram positive cocci occurring singly and in pairs. Narrative:         Lab Results   Component Value Date    BC No growth-preliminary  No growth   12/19/2017       IMAGING per radiologist interpretation: CT spine    Impression:       1. There is soft tissue swelling overlying the spinous processes of the thoracic spine. Specifically, the area of soft tissue swelling overlying the spinous processes between the scapulae, in the area of reported concern, demonstrate no definite evidence   of osseous erosion. If there is clinical concern for osteomyelitis, further characterization could be obtained with MRI. 2. Multiple anterior wedge compression deformities are demonstrated involving the T3-T10 levels. There is also mild anterior wedge compression deformity at T12. The anterior wedge compression deformities of T5 and T6 demonstrate a cortical step-off along   the superior endplates suggesting possible acute on chronic fracture. Correlate clinically for point tenderness at these locations. 3. Severe bilateral emphysematous changes noted. 4. Small bilateral pleural effusions are demonstrated. Impression:       1. Multiple anterior wedge compression deformities involving multiple thoracic levels from T4 through T10. These are of indeterminate age. There is resultant accentuated thoracic kyphosis. No definite erosive changes are identified. However, please refer   to thoracic spine from same day for further description. Assessment    1. Thoracic spine/upper back cellulitis. 2.  Unstageable thoracic ulcer with pressure ulcer to the back that is  worsening. 3.  Cellulitis of the skin of the back, thoracic region. 4. MRSA infection. 5.  Chronic kidney disease.   6.  Severe anemia with hemoglobin 6.2.  7. Coronary artery disease. 8.  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  9.  Coccyx area unstageable ulcer present on admission in association with moisture-related skin damage. 10. Chronic respiratory failure with hypoxia in the setting of COPD, being treated with chronic continuous O2   11. Wedge compression fracture of T5 & T6     Plan   Continue current antibiotics with vanco   Monitor creatinine  Continue wound care with dakins  Ortho spine consult for wedge compression fractures - no intervention planned   Needs foam offloading    d/w    D/w rn        Electronically signed by Bekah De La Cruz MD on 1/2/2018 at 7:57 AM

## 2018-01-03 NOTE — PROCEDURES
Procedure Note Debridement of ulcer    Pt Name: Jen Beverly  MRN: [id number]  [id number]  YOB: 1933  Admit Date: 12/22/2017  3:03 PM  Date of evaluation: 1/3/2018  Primary Care Physician: Tr Montelongo MD   [id number]       · Assessment:    1.  Thoracic spine/upper back cellulitis. 2.  Unstageable thoracic ulcer with pressure ulcer to the back that is  worsening. 3.  Cellulitis of the skin of the back, thoracic region. 4.  MRSA infection. 5.  Chronic kidney disease. 6.  Severe anemia with hemoglobin 6.2.  7.  Coronary artery disease. 8.  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 9.  Coccyx area unstageable ulcer present on admission in association with moisture-related skin damage. 10. Chronic respiratory failure with hypoxia in the setting of COPD, being treated with chronic continuous O2   11. Wedge compression fracture of T5 & T6   ·   ·   · Patient Active Problem List:  ·    Hearing loss  ·    Acute kidney injury ([address]  ·    Anemia  ·    Scalp wound  ·    Squamous cell skin cancer  ·    Decubitus ulcer  ·    Cellulitis of back except buttock  ·    Cellulitis  ·    Hyperkalemia  ·    Vitamin D deficiency  ·    MRSA (methicillin resistant staph aureus) culture positive  ·    Chronic respiratory failure (HCC)  ·    Chronic CHF (congestive heart failure) (HCC)  ·    Superficial venous thrombosis of left arm  ·   ·   Procedure Note: Debridement of the Site: Thoracic ulcer     This procedure has been fully reviewed with the patient and written informed consent has been obtained, time out done       ProcedureExcisional Debridement    The ulcer was anesthestized with2% lidocaine with epinephrine  instrument used:scapel, scissors, curette;debridement depth:subcutaneous level In the posterior thoracic area.       Procedure details:using forceps curette and scissors excisional debridement done into skin subcutaneous level of the Site: thoracic back ulcer     Patient
AM   Calculated Wound Size (cm^2) (l*w) 12.65 cm^2 12/22/2017  9:30 AM   Change in Wound Size % (l*w) -5.42 12/22/2017  9:30 AM   Tunneling Position ___ O'Clock 3 12/19/2017  2:31 PM   Undermining Starts ___ O'Clock 6 12/19/2017  2:31 PM   Undermining Ends___ O'Clock 0.6 12/19/2017  2:31 PM   Wound Assessment Edema; Yellow 12/22/2017  9:30 AM   Drainage Amount None 12/25/2017 11:15 PM   Drainage Description Serosanguinous 12/22/2017  9:30 AM   Odor None 12/19/2017  2:31 PM   Margins Attached edges; Defined edges 12/22/2017  9:30 AM   Michelle-wound Assessment Non-blanchable erythema;Purple;Red 12/22/2017  9:30 AM   Red%Wound Bed 25 12/22/2017  9:30 AM   Yellow%Wound Bed 75 12/22/2017  9:30 AM   Number of days: 8       Wound 12/19/17 Buttocks Left unstageable with MASD (Active)   Wound Image   12/19/2017  2:31 PM   Wound Type Wound 12/27/2017 10:55 AM   Wound Unstageable 12/22/2017  4:15 PM   Dressing Status Intact 12/27/2017 10:55 AM   Dressing Changed Changed/New 12/27/2017 10:55 AM   Dressing/Treatment Other (Comment) 12/27/2017 10:55 AM   Wound Length (cm) 0.5 cm 12/19/2017  2:31 PM   Wound Width (cm) 0.3 cm 12/19/2017  2:31 PM   Wound Depth (cm)  scab 12/19/2017  2:31 PM   Calculated Wound Size (cm^2) (l*w) 0.15 cm^2 12/19/2017  2:31 PM   Drainage Amount None 12/19/2017  2:31 PM   Black%Wound Bed 100 12/19/2017  2:31 PM   Number of days: 8       Incision 12/11/17 Arm Right (Active)   Wound Assessment Clean;Dry 12/25/2017  1:07 PM   Michelle-wound Assessment Other (Comment) 12/27/2017 10:55 AM   Closure JOLEEN 12/25/2017  1:07 PM   Drainage Amount None 12/27/2017 10:55 AM   Dressing/Treatment Ace Wrap 12/27/2017 10:55 AM   Dressing Status Clean;Dry 12/25/2017  1:07 PM   Number of days: 16         Procedure Note: Debridement of the Site: Thoracic pressure ulcer that is worsening with skin necrosis     This procedure has been fully reviewed with the patient and written informed consent has been obtained, time out done

## 2018-01-03 NOTE — PROGRESS NOTES
Patient resting quietly in bed eyes closed. Call light, and over bed table in reach. No c/o or s/s of pain noted at this time. Patient noted to sit on side of bed and use urinal. Dressing changed to wound on back.

## 2018-01-03 NOTE — PLAN OF CARE
Problem: RESPIRATORY  Goal: Clear lung sounds  Outcome: Ongoing  dulera ongoing to improve aeration.  Breath sounds clear and diminished

## 2018-01-03 NOTE — PLAN OF CARE
Problem: Risk for Impaired Skin Integrity  Goal: Tissue integrity - skin and mucous membranes  Structural intactness and normal physiological function of skin and  mucous membranes. Outcome: Ongoing  In to see patient to re assess wounds to the left of coccyx/buttock, right arm and lumbar. Patient resting in bed but stating he would like to get up to eat his breakfast in the chair. While waiting for aid to assist with getting patient up, loose ace wraps removed from the lower legs. Blanchable redness noted to the anterior ankles and feet where patient has protruding foot bones. Ace wraps left off for now and instructed to have them rewrapped after breakfast if Dr. Eliceo Fournier still wants the compression will need to use surepress padding or kerlix under the wraps. Primary nurse states Dr. Eliceo oFurnier to come this am to debride lumbar wound. Lumbar dressing is clean dry and intact. Will re assess it at another time. Request staff obtain photo of lumbar wound today. Pauma bruising continues to be noted to the darren lumbar darren wound and patient continues to use the off loading wedge prescribed by Dr. Eliceo Fournier. Right arm graft site is healed w/ dry scabbing noted to the edges attached to the aidan. Dr. Natalya Hood aware of staples appearing to be ready to remove.       With aid and assist of walker patient assisted to the chair. Scabbing area noted to the left of the coccyx that is an improving present on admission unstageable pressure injury. The area is blanchable pink with small scabbing skin of 2.5x2cm. Dr. Eliceo Fournier has ordered bordered foam to the area. Foam was in place but removed due to soiling with stool to the lower edge. Primary nurse to apply another foam.  Patient has an SPR mattress to bed. Waffle cushion is in chair with wedge cushion to his back. Breakfast and call light placed in front of patient. Will cont to follow.       Care plan reviewed with patient and RN.   Patient and RN verbalize

## 2018-01-03 NOTE — PROGRESS NOTES
Patient wife upset and wants to meet with Dominique Tijerina before she leaves at 4 pm.  Message left per perfect serve

## 2018-01-03 NOTE — PROGRESS NOTES
Infectious Diseases Progress Note  Pt Name: Shreyas Holman  MRN: [id number]  [place]: 7/29/1933  Admit Date: 12/22/2017  3:03 PM  Date of evaluation: 1/3/2018  Primary Care Physician: Asia Stuart MD   [id number]-A 70-year-old male with history of problems with his thoracic scoliosis and chronic problems with  breathing and he does sit for prolonged times in his recliner and he has developed ulcer in the thoracic spine that started in 07/2017. The patient was seen and evaluated in the past by Dr. Hayley Chavez and he is referred for further evaluation and initially seen in our office on 09/25/2017. The patient also appears to have improved initially, but after a few visits over the last month, his ulcer continued to worse. The patient unfortunately sits and reclines right over the ulcer site and his scoliosis essentially is not helping it. The patient had cultures done on 12/08/2017 and that showed MRSA and the patient presented to the office with continued worsening of the cellulitis, so decision was made to admit him to the hospital for further evaluation and treatment. The patient has been noted to have problems with his elevated creatinine and also is not noted to have significant problems with anemia with hemoglobin of 6.2.   Given 2 units of PRBCs. He still continues to lean against the back. Benetta Cem His body habitus is a major problem. On iv vancomycin since 12/19  Tissue cultures 12/20 and 12/27 with MRSA positive   Subjective: Interval History:  Patient in bed and d/w rn and plan debridement. He wants to go home   Pt/Chart review last 24 hours:  Fever No, Diarrhea No, Dyspnea No,     Objective:      Vitals: /61   Pulse 93   Temp 97.1 °F (36.2 °C) (Oral)   Resp 20   Ht 5' 8\" (1.727 m)   Wt 122 lb 11.2 oz (55.7 kg)   SpO2 99%   BMI 18.66 kg/m²    HEENT: Head: Normal, normocephalic, atraumatic.   Heart: regular rate and rhythm  Lungs: clear to auscultation bilaterally  Abdomen: soft, non-tender; region. 4. MRSA infection. 5.  Chronic kidney disease. 6.  Severe anemia with hemoglobin 6.2.  7.  Coronary artery disease. 8.  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  9.  Coccyx area unstageable ulcer present on admission in association with moisture-related skin damage. 10. Chronic respiratory failure with hypoxia in the setting of COPD, being treated with chronic continuous O2   11. Wedge compression fracture of T5 & T6     Plan   Continue current antibiotics with vanco   Monitor creatinine  Cbc am and  to eval for anemia ( saw recently)  Continue wound care with dakins  Ortho spine consult for wedge compression fractures - no intervention planned   Needs foam offloading    d/w    D/w rn    discharge planning Friday     Electronically signed by Hailey Caro MD on 1/3/2018 at 8:17 AM

## 2018-01-03 NOTE — PROGRESS NOTES
goal 2: Pt will be educated on an HEP for strengthening ex to increase endurance and UE strength for completing home mgt task

## 2018-01-03 NOTE — PROGRESS NOTES
Iv team called and left a message regarding inability to draw labs from either port on his picc line.

## 2018-01-04 PROBLEM — E43 SEVERE MALNUTRITION (HCC): Status: ACTIVE | Noted: 2018-01-01

## 2018-01-04 NOTE — DISCHARGE SUMMARY
topically 2 times daily. budesonide-formoterol (SYMBICORT) 160-4.5 MCG/ACT AERO  Inhale 2 puffs into the lungs 2 times daily. cloNIDine (CATAPRES) 0.1 MG tablet  Take 1 tablet by mouth 2 times daily             darbepoetin gal-polysorbate (ARANESP) 100 MCG/0.5ML SOSY injection  Inject 0.5 mLs into the skin once a week             doxycycline hyclate (VIBRAMYCIN) 100 MG capsule  Take 1 capsule by mouth 2 times daily for 14 days             dronabinol (MARINOL) 2.5 MG capsule  Take 1 capsule by mouth 2 times daily for 30 days. ferrous sulfate 325 (65 Fe) MG tablet  Take 1 tablet by mouth 2 times daily (with meals)             folic acid (FOLVITE) 1 MG tablet  Take 1 tablet by mouth daily             furosemide (LASIX) 40 MG tablet  Take 40 mg by mouth daily. metoprolol tartrate (LOPRESSOR) 25 MG tablet  Take 1 tablet by mouth 2 times daily             Multiple Vitamins-Minerals (MULTIVITAMIN PO)  Take 1 tablet by mouth daily. Last dose 3- for surgery 3-             OXYGEN  Inhale 2 L into the lungs continuous              pantoprazole (PROTONIX) 40 MG tablet  Take 1 tablet by mouth daily. predniSONE (DELTASONE) 5 MG tablet  Take 5 mg by mouth daily.              sodium hypochlorite (DAKINS) 0.25 % SOLN  Use for wound irrigation as instructed             tamsulosin (FLOMAX) 0.4 MG capsule  Take 2 capsules by mouth daily             vitamin A 34384 units capsule  Take 10,000 Units by mouth daily             vitamin D (CHOLECALCIFEROL) 1000 UNIT TABS tablet  Take 5 tablets by mouth daily             zinc gluconate 50 MG tablet  Take 50 mg by mouth daily                 40 minutes spent preparing the patient for discharge    Belem Salamanca MD

## 2018-01-04 NOTE — PLAN OF CARE
Rafael Bernard  [id number]    This document includes comprehensive careplan information as well as current active orders for this admission to Transitional Care Unit (8E). A copy was given to the patient and/or patient representative. Current Active Orders:  Orders Placed This Encounter   Procedures    Aerobic & Anaerobic Culture    VL DUP UPPER EXTREMITY VENOUS LEFT    Basic Metabolic Panel    Blood Occult Stool Screen #1    Vancomycin, trough    CBC Auto Differential    Comprehensive Metabolic Panel    Basic Metabolic Panel    DIET GENERAL;    Apply moisture barrier ointment to clean, dry skin    Cleanse skin at time of soiling using a mild cleansing agent    Elevate heels off of bed at all times if patient is not able to move lower extremities    Inspect skin per unit guidelines    Maintain HOB at the lowest elevation consistent with medical plan of care    Place pressure redistribution overlay/mattress/bariatric bed    Sacral foam dressing for prevention    Turn or assist with turn every 2 hours if patient is unable to turn self. Remind patient to turn if necessary.     Use cushion while in chair    Elevate affected limb    Tobacco cessation education    Vital signs per unit routine    Apply ace wrap    Daily weights    Maintain sequential compression device    Strict intake and output    Wound care    Tip Confirmation System (TCS) and/ or Chest Xray for tip confirmation    Debride wound    Full Code    Inpatient consult to Plastic Surgery    Pharmacy to Dose: Vancomycin    Inpatient consult to Dietitian    Consult to Recreation Therapy    Consult to Social Work    Contact isolation    Dietary Nutrition Supplements: Wound Healing Oral Supplement    Dietary Nutrition Supplements: Standard High Calorie Oral Supplement    Dietary Nutrition Supplements: Frozen Oral Supplement    OT eval and treat    PT eval and treat    Initiate Oxygen Therapy Protocol    MDI Treatment

## 2018-01-04 NOTE — PROGRESS NOTES
Patient calm appropriate interaction. Oriented to place, and time. Mucous membranes pink, and moist. Skin dry with scattered bruising throughout arms, and body. Left hand has a scabbed over sore in the middle top by the knuckles. Left shoulder bruise looks to be in healing stages. Mid back sore covered with dry dressing redness noted blanchable. Coccyx sore open redness around open part blanchable. Right forearm with 10 staples looks to be in the healing stages, with scabbing. Right upper arm has a lump states non painful to palpation. Inguinal hernia on the right groin area. Red bruising along the left leg, and foot. There is also brushing on the right foot as well red and blanchable. Pic line without redness or swelling. Speech clear. No difficulty swallowing noted. Lung sounds clear throughout. Respirations easy. Slight non productive cough noted. Nasal cannula at 3 liters per minute. Bowel sounds active in all four quadrants. Round firm abdomen non tender to palpation. States passing gas. Last bowel movement was midmorning. States no problem with urination. Radial pulses strong and equal. Hand grasp strong and equal. Arm drift negative. Capillary refill less than three seconds. Skin turgor sluggish. No edema noted. Pedal push, and pull strong, and equal. Jerri's sign negative bilaterally. Leg lift strong bilaterally. States no numbness or tingling. In bed visiting with wife and .

## 2018-01-04 NOTE — PLAN OF CARE
Problem: DISCHARGE BARRIERS  Goal: Patient's continuum of care needs are met    Intervention: INVOLVE PATIENT/S.O. IN DISCHARGE PLANNING PROCESS  The patient has requested to end skilled services (IVAB) and be discharged home. The infectious disease specialist has agreed to discontinue the IVAB tomorrow, 1/5, paving the way for the patient to leave TCU. In place of the IVAB, the patient will be started on oral medication. Home health nursing visits are being ordered for dressing changes and wound monitoring. The  contacted patient's spouse by phone and arranged to meet with her in the patient's room to discuss discharge plans. Topics covered included: new oral medication and out of pocket cost, referral to home health agency for nursing visits, & need for new family physician. The spouse asked that her local pharmacy be contacted regarding cost for prescribed Vibramycin, and a call was made to [facility] in [place] for this purpose. [facility] was contacted to inquire about any cost associated with the provision of nursing services, and they reported that this would be paid at 100% with no deductible. The spouse had multiple questions related to the nursing visits and this worker explained as much as she was able. Confirmation given for [facility] to be the provider, rather than a referral to the [place] for this service. After seeking a new physician for the patient (who prefers a male physician), the spouse mentioned that she had made arrangements for her PAC-A, Nicholas Snow, to accept patient to her service. The office was contacted and a new patient appt has been scheduled for Jan. 9th. The spouse has requested to transport patient home after lunch tomorrow, around 1:30-2pm. Will finalize discharge plans.  AGUS Fortune

## 2018-01-04 NOTE — PLAN OF CARE
Problem: Falls - Risk of  Goal: Absence of falls  Outcome: Ongoing  Free from falls at this time. Fall band, sign and non-skid footwear is worn. Problem: Nutrition  Goal: Optimal nutrition therapy  Outcome: Ongoing  Optimal nutrition provided daily. Problem: Pain:  Goal: Pain level will decrease      Outcome: Completed Date Met: 01/04/18    Goal: Control of acute pain      Outcome: Ongoing  Pt did not want pain medication this morning when asked. Denied pain. Problem: Bleeding:  Goal: Will show no signs and symptoms of excessive bleeding      Outcome: Ongoing  No s/s of excessive bleeding or coagulation. Will continue to monitor. Problem: Infection - Methicillin-Resistant Staphylococcus Aureus Infection:  Goal: Absence of methicillin-resistant Staphylococcus aureus infection      Outcome: Ongoing  Contact precautions in place. Problem: DISCHARGE BARRIERS  Goal: Patient's continuum of care needs are met  Outcome: Ongoing  Pt's daily needs are met. Problem: Pressure Ulcer  Goal: Promote pressure ulcer healing  Outcome: Ongoing  Encouraged to drink his protein supplements to facilitate wound healing. Problem: OXYGENATION/RESPIRATORY FUNCTION  Goal: Adequate oxygenation  Outcome: Ongoing  On 2L of supplemental oxygen and sats are WNL. Problem: Fluid Volume - Risk of, Imbalanced  Goal: Will remain free of signs and symptoms of dehydration        Outcome: Ongoing  Pt is encouraged to drink more fluids. Pt does have chronic CHF. Problem: Infection - Surgical Site:  Goal: Will show no infection signs and symptoms      Outcome: Ongoing  Currently on IV Vancomycin but will be d/c on oral antibiotic vibramycin. Comments: Care plan reviewed with patient and spouse. Patient and spouse verbalize understanding of the plan of care and contribute to goal setting.

## 2018-01-04 NOTE — PROGRESS NOTES
auscultation bilaterally  Abdomen: soft, non-tender; bowel sounds normal; no masses,  no organomegaly  Extremities: edema lower extremities with pitting 2+  Neurologic: Mental status: Alert, oriented, thought content appropriate   Wound-on the thoracic area with slough and skin changes . Toma Martínez Noted     Data:   Current ATBs: vanco 12/19     Scheduled Meds:   silver nitrate applicators   Topical Once    enoxaparin  30 mg Subcutaneous Daily    lidocaine-EPINEPHrine  20 mL Intradermal Once    ferrous sulfate  325 mg Oral BID WC    dronabinol  2.5 mg Oral BID    vancomycin  1,000 mg Intravenous Q48H    sodium hypochlorite   Irrigation Daily    vitamin D  5,000 Units Oral Daily    sodium chloride flush  10 mL Intravenous 2 times per day    cloNIDine  0.1 mg Oral BID    darbepoetin gal-polysorbate  100 mcg Subcutaneous Weekly    folic acid  1 mg Oral Daily    furosemide  40 mg Oral Daily    metoprolol tartrate  25 mg Oral BID    mometasone-formoterol  2 puff Inhalation BID    multivitamin  1 tablet Oral Daily    pantoprazole  40 mg Oral Daily    predniSONE  5 mg Oral Daily    tamsulosin  0.8 mg Oral Daily    [START ON 12/19/2018] vancomycin (VANCOCIN) intermittent dosing (placeholder)   Other RX Placeholder    vitamin A  10,000 Units Oral Daily    vitamin C  250 mg Oral Daily     Continuous Infusions:   bacitracin         I/O last 3 completed shifts: In: 56 [P.O.:610; I.V.:20]  Out: 150 [Urine:150]  No intake/output data recorded.     Intake/Output Summary (Last 24 hours) at 01/04/18 0837  Last data filed at 01/03/18 2138   Gross per 24 hour   Intake              630 ml   Output              150 ml   Net              480 ml     CBC:   Recent Labs      01/04/18   0500   WBC  4.1*   HGB  7.3*   HCT  22.0*   PLT  195     BMP:    Recent Labs      01/02/18   0418  01/04/18   0500   NA  145  146*   K  4.5  4.8   CL  97*  96*   CO2  44*  45*   BUN  83*  77*   CREATININE  1.7*  1.4*   GLUCOSE  126*  108 with pressure ulcer to the back that is  worsening. 3.  Cellulitis of the skin of the back, thoracic region. 4. MRSA infection. 5.  Chronic kidney disease. 6.  Severe anemia with hemoglobin 6.2.  7.  Coronary artery disease. 8.  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  9.  Coccyx area unstageable ulcer present on admission in association with moisture-related skin damage. 10. Chronic respiratory failure with hypoxia in the setting of COPD, being treated with chronic continuous O2   11. Wedge compression fracture of T5 & T6     Plan   Continue current antibiotics with vanco   Monitor creatinine   to eval for anemia ( saw recently)  Continue wound care with dakins  Ortho spine consult for wedge compression fractures - no intervention planned   Needs foam offloading    d/w    D/w rn    discharge planning Friday on bactrim po   Options of care narrow to complete     Electronically signed by Amor Bailey MD on 1/4/2018 at 8:37 AM

## 2018-01-04 NOTE — PROGRESS NOTES
6 Sutures and 6 staples were removed from R forearm. All were intact. Dry dressing was placed over site as there was scant amount of bright red blood as the sutures and staples were embedded in 2 areas. Pt tolerated procedure well.

## 2018-01-05 NOTE — PROGRESS NOTES
Nutrition Assessment    Type and Reason for Visit: Reassess (po & supplement monitor)    Nutrition Recommendations: Continue regular wt checks. Agree with appetite stimulant. Continue to monitor Hgb & renal status. Malnutrition Assessment:  · Malnutrition Status: Meets the criteria for severe malnutrition  · Context: Acute illness or injury  · Findings of the 6 clinical characteristics of malnutrition (Minimum of 2 out of 6 clinical characteristics is required to make the diagnosis of moderate or severe Protein Calorie Malnutrition based on AND/ASPEN Guidelines):  1. Energy Intake-Less than or equal to 75%, greater than or equal to 1 month    2. Weight Loss-10% loss or greater,  (10 days)  3. Fat Loss-Moderate subcutaneous fat loss, Orbital  4. Muscle Loss-Moderate muscle mass loss, Temples (temporalis muscle), Clavicles (pectoralis and deltoids)    Nutrition Diagnosis:   · Problem: Severe malnutrition, in context of acute illness or injury  · Etiology: related to Insufficient energy/nutrient consumption     Signs and symptoms:  as evidenced by Moderate loss of subcutaneous fat, Moderate muscle loss, Weight loss    Nutrition Assessment:  · Subjective Assessment: Pt COREEN, wounds & severe malnutrition seen sitting  up in chair. Pt with very red eyes, reports he had oatmeal for breakfast & they put ice cream in is Ensure & pt has drank most of it out of cup so far. Per pt taking some of the Tom, other envelopes in room. Pt had bm yesterday. Staff encouraging po. Hgb 7.3, BUN 80 , Cr 1.6. meds include MVI, Vitamin A, C & D , folic acid, marinol, lasix & prednisone.    · Wound Type: Multiple, Unstageable, Pressure Ulcer (unstageable pressure ulcer to mid back; left buttock unstageable w/MASD, R. right wound)  · Current Nutrition Therapies:  · Oral Diet Orders: General   · Oral Diet intake: 1-25%, 26-50%, 51-75%  · Oral Nutrition Supplement (ONS) Orders: Wound Healing Supplement, Standard High Calorie Oral Supplement  · ONS intake: % ( on Ensure)  · Anthropometric Measures:  · Ht: 5' 8\" (172.7 cm)   · Current Body Wt: 120 lb 5.9 oz (54.6 kg) (1/5 no edema noted)  · Admission Body Wt: 154 lb 4.8 oz (70 kg) (12/19; +1 pitting edema BLE)  · Usual Body Wt: 148 lb (67.1 kg) (per spouse report; 148 lb 3.2 oz on 8/16/17)  · % Weight Change: 16% wt. loss,  since 12/19, intake has been poor, previous + 1 edema BLE  · Ideal Body Wt: 154 lb (69.9 kg), BMI Classification: BMI <18.5 Underweight (18.3)  · Comparative Standards (Estimated Nutrition Needs):  · Estimated Daily Total Kcal: 7339-7972 kcal/day  · Estimated Daily Protein (g): 105-125 g/day    Estimated Intake vs Estimated Needs: Intake Less Than Needs    Nutrition Risk Level: High    Nutrition Interventions:   Continue current ONS (appetite stimulant started)  Education not appropriate at this time, Continued Inpatient Monitoring    Nutrition Evaluation:   · Evaluation: No progress toward goals   · Goals: Pt will consume 75% or more of meals during LOS to aid in woundh healing and weight maintenance    · Monitoring: Meal Intake, Supplement Intake, Skin Integrity, Wound Healing, Ascites/Edema, Weight, Comparative Standards, Pertinent Labs    See Adult Nutrition Doc Flowsheet for more detail.      Electronically signed by Nehal Kelly RD, LD on 1/5/18 at 10:19 AM    Contact Number: [phone]

## 2018-01-05 NOTE — PLAN OF CARE
Problem: Nutrition  Goal: Optimal nutrition therapy  Outcome: Ongoing  Nutrition Problem: Severe malnutrition, in context of acute illness or injury  Intervention: Food and/or Nutrient Delivery: Continue current ONS (appetite stimulant started)  Nutritional Goals: Pt will consume 75% or more of meals during LOS to aid in woundh healing and weight maintenance

## 2018-01-05 NOTE — PROGRESS NOTES
Alert, oriented to person, place, time. BRADEN at 3 mm to 2 mm brisk. Mucous membranes pink, moist. Left corner inside mouth two small white. blue bumps. Hair dry. Skin left hand top of knuckles scabbed sore. Large lump groin area no redness,warmth,drainage. States \"no pain and history of inguinal hernia\". Upper right arm lump  States \"no pain\". No swellinng, redness, warmth. Leftt shoulder moderate bruising, no scabbing. Right upper arm mild bruising no drainage, swelling. Mid dorsal back dry dressing. moderate redness, swelling, no warmth. Coccyx area open sore minimal redness, scabbing. Right forearm Moderate scabbing, no swelling, warmth. Minimal redness. Speech clear. No difficulty swallowing. Lung sounds clear throughout. Respirations ease, deep. Slight cough noted. O2 at 3 liters per nasal cannula. Head of bed elevated at 30 degrees. Bowel sounds active all four quadrants. Abdomen, flat, soft non-tender. Passing gas, urinating ok. Last BM 1-5-18. Radial pulse strong, regular, bilateral. Hand grasp strong, bilateral. Arm drift negative, bilateral.Capillary refill less than 3 seconds. Skin turgor sluggish. No edema noted. Pedal pulses weak, regular, bilateral. Jerri's sign negative, bilateral. Leg lift strong, bilateral. States \"no numbness or tingling. Bed low position side rails up times two. Wheels locked. Call light in reach. In bed visiting with wife.

## 2018-01-05 NOTE — DISCHARGE INSTR - OTHER ORDERS
yourself a full relief of weight every hour. ? For a weight shift, lean forward and to the left and right. Push up out of the chair with your arms. If you have a chair that tilts, use the tilt control to help relieve pressure. ? For a full relief of weight, stand up or move to another chair or bed if you are able to. Personal care  · Check your skin every day, especially around bony areas. When a pressure sore is forming, skin temperature can be different than nearby skin. It might be warmer or cooler. The skin can feel either firmer or softer than the surrounding skin. · Keep your skin clean and free of sweat, wound drainage, urine, and feces. · Use skin lotions to keep your skin from drying out and cracking. Barrier lotions or creams have ingredients that can act as a shield to help protect the skin from moisture or irritation. · Try not to slide or slump across sheets in a chair or bed. And try not to sleep in a recliner chair. Lifestyle choices  · Eat healthy foods with plenty of protein to help heal damaged skin and to help new skin grow. · Get plenty of fluids. · Stay at a healthy weight. Being either overweight or underweight can make pressure sores more likely. · If you smoke, stop. Smoking dries the skin and reduces its blood supply. If you need help quitting, talk to your doctor about stop-smoking programs and medicines. These can increase your chances of quitting for good. Ask about using cushions or pads  · Overlays are special pads you put on top of a mattress. They provide a softer surface that will fit your body's shape better than a regular mattress. · Cushions or devices can be used to reduce pressure on certain areas of the body. For example, you can use a \"medical heel pillow\" to help prevent pressure sores on heels. You can also get cushions for seating surfaces, such as wheelchair seats. Talk with your doctor about cushions and pads.  Some products, such as doughnut-type devices, may

## 2018-01-05 NOTE — PLAN OF CARE
Problem: DISCHARGE BARRIERS  Goal: Patient's continuum of care needs are met    Intervention: INVOLVE PATIENT/S.O. IN DISCHARGE PLANNING PROCESS  The patient has been discharged from [address] [place] [address] today, 1/5, and is returning home with assistance of his spouse. A referral has been made to [address] [place] [address] for nursing visits to assist with dressing changes and to monitor patient's wounds. [facility] intake nurse met with patient and spouse today to overview services, and this was helpful to the spouse in answering her questions. No other concerns identified. Discharge goals have been met.  AGUS Peoples

## 2018-01-05 NOTE — PROGRESS NOTES
Alert, oriented to person, place, time. BRADEN at 3 mm to 2 mm brisk. Mucous membranes pink, moist. Left corner inside mouth two small white. blue bumps. Hair dry. Skin left hand top of knuckles scabbed sore. Large lump groin area no redness,warmth,drainage. States \"no pain and history of inguinal hernia\". Upper right arm lump  States \"no pain\". No swellinng, redness, warmth. Leftt shoulder moderate bruising, no scabbing. Mid dorsal area dry dressing. moderate redness, swelling, no warmth. Coccyx area open sore minimal redness, scabbing. Right forearm Moderate scabbing, no swelling, warmth. Minimal redness. Speech clear. No difficulty swallowing. Lung sounds clear throughout. Respirations ease. Slight cough noted. O2 at 3 liters per nasal cannula. Head of bed elevated at 30 degrees. Bowel sounds active all four quadrants. Abdomen, flat, soft non-tender. Passing gas, urinating ok. Last BM 1-4-18. Radial pulse strong, regular, bilateral. Hand grasp strong, bilateral. Arm drift negative, bilateral. PIC Line intact, dry, no redness,swelling, drainage. Capillary refill less than 3 seconds. Skin turgor sluggish. No edema noted. Pedal pulses weak, regular, bilateral. Jerri's sign negative, bilateral. Leg lift strong, bilateral. States \"no numbness or tingling. Bed low position side rails up times two. Wheels locked. Call light in reach states \"no needs at the time\".

## 2018-02-12 PROBLEM — I46.9 CARDIAC ARREST (HCC): Status: ACTIVE | Noted: 2018-01-01

## 2018-02-12 NOTE — ED NOTES
Spoke with Dr. Shelly Hameed about taking pt for CT. Will wait to stabalize pt BP prior to transporting for CT.       Meryl Schwarz RN  02/12/18 [id number]

## 2018-02-12 NOTE — ED NOTES
Wife states the home health nurse was walking him with his walker when he slumped over the walker. Pt was then pulled to the ground and CPR was started by home health nurse. Wife states she has been trying to get pt to come to the hospital for increased swelling for the past 2 weeks but pt has not wanted to.       Jane Gutierrez, RN  02/12/18 355 [facility], RN  02/12/18 1423

## 2018-02-12 NOTE — ED NOTES
Family remains at bedside. Resp assisted with ventilator. SR up x 2. Family denies needs.       Marine Burrell RN  02/12/18 7336

## 2018-02-12 NOTE — ED PROVIDER NOTES
[facility]  eMERGENCY dEPARTMENT eNCOUnter          CHIEF COMPLAINT       Chief Complaint   Patient presents with    Other     post code       Nurses Notes reviewed and I agree except as noted in the HPI. HISTORY OF PRESENT ILLNESS    Tamia Easton is a 80 y.o. male who presents to the Emergency Department via EMS for a cardiac arrest. The patient arrived to the ED intubated with CPR being done en route. He was given 6 rounds of epi and one bicarb en route. Patient went from asystole to an organized rhythm. Patient has a history of CHF and cellulitis. Per EMS, the patient has MRSA in his back sore and he is seeping fluid from his legs. Per EMS, the patient was short of breath, but when they arrived, the home health nurse was doing CPR. REVIEW OF SYSTEMS     Review of Systems   Unable to perform ROS: Intubated       PAST MEDICAL HISTORY    has a past medical history of AAA (abdominal aortic aneurysm) ([facility] [address] CAD (coronary artery disease); Cancer ([facility] [address] Chronic CHF (congestive heart failure) (HCC); COPD (chronic obstructive pulmonary disease) ([facility] [address] History of blood transfusion; Hx of blood clots; Hypertension; and MI (myocardial infarction). SURGICAL HISTORY      has a past surgical history that includes Aorta surgery (2006?? ); skin biopsy; Tonsillectomy and adenoidectomy; eye surgery (2004? ??); Hemorrhoid surgery (???); Skin cancer excision (Left, 10/31/2014); e-malignant / benign skin lesion excision (3/27/2015); other surgical history (12/05/2016); other surgical history (Left, 02/20/2017); other surgical history (Left, 08/16/2017); Biopsy external ear (Left, 8/16/2017); Cardiac catheterization (1980's); Cardiac surgery (2006); Cardiac surgery (2006); hernia repair (1980's???); other surgical history (Right, 12/11/2017); and EXCISION / BIOPSY SKIN LESION OF ARM (Right, 12/11/2017).     CURRENT MEDICATIONS       Current Discharge Medication List      CONTINUE these medications which left lungs more severely in the left upper lobe. 2.  Bilateral small pleural effusions. 3.  No other acute finding the chest.   4.  Age-indeterminate thoracic vertebral body compression fractures. 5.  Findings suggestive of acute pancreatitis. 6.  Small to moderate amount of water density free fluid in the pelvis could be reactive secondary to pancreatitis. 7.  There is a loop of small bowel within the patulous right inguinal canal with no evidence of bowel obstruction from this incarceration. **This report has been created using voice recognition software. It may contain minor errors which are inherent in voice recognition technology. **      Final report electronically signed by Dr. Jenae Agee on 2/12/2018 10:16 PM      CT ABDOMEN PELVIS WO CONTRAST Additional Contrast? None   Final Result   1. Pneumonia involving the right and left lungs more severely in the left upper lobe. 2.  Bilateral small pleural effusions. 3.  No other acute finding the chest.   4.  Age-indeterminate thoracic vertebral body compression fractures. 5.  Findings suggestive of acute pancreatitis. 6.  Small to moderate amount of water density free fluid in the pelvis could be reactive secondary to pancreatitis. 7.  There is a loop of small bowel within the patulous right inguinal canal with no evidence of bowel obstruction from this incarceration. **This report has been created using voice recognition software. It may contain minor errors which are inherent in voice recognition technology. **      Final report electronically signed by Dr. Jenae Agee on 2/12/2018 10:16 PM      CT Cervical Spine WO Contrast   Final Result   1. No acute fractures or acute subluxations. 2.  Degenerative changes as described level by level. 3.  Old appearing healed bilateral posterior first rib fractures. **This report has been created using voice recognition software.  It may contain minor errors which are inherent in voice recognition technology. **      Final report electronically signed by Dr. Radha Ceron on 2/12/2018 9:56 PM      CT HEAD WO CONTRAST   Final Result   1. There is no acute large vessel infarct. 2. There is a small focus of subcortical hypoattenuation within the right frontal lobe (series 3 image 31). It is difficult to differentiate volume averaging related to a sulcus from a hypodense subcortical lesion. MRI of the brain is recommended for    additional evaluation. 3. There is moderate white matter disease within the periventricular deep white matter and within the centrum semiovale. 4. Opacification of the right middle ear. ENT referral recommended. 5. Sinus disease as described in the body the report. **This report has been created using voice recognition software. It may contain minor errors which are inherent in voice recognition technology. **      Final report electronically signed by Dr. Corie Pritchett on 2/12/2018 8:15 PM      XR CHEST PORTABLE   Final Result   Impression:   1. Bilateral pleural effusions and interval development of bilateral airspace opacities greatest in the left upper lobe correlate for edema or pneumonia or a combination. 2. Endotracheal tube is 2.5 cm above the andreia. Consider retracting approximately 2 cm. **This report has been created using voice recognition software. It may contain minor errors which are inherent in voice recognition technology. **      Final report electronically signed by Dr. Melissa Asencio on 2/12/2018 7:39 PM          LABS:   Labs Reviewed   BLOOD GAS, ARTERIAL - Abnormal; Notable for the following:        Result Value    pH, Blood Gas 7.16 (*)     PCO2 92 (*)     PO2 256 (*)     HCO3 33 (*)     Base Excess (Calculated) 2.8 (*)     All other components within normal limits   CBC WITH AUTO DIFFERENTIAL - Abnormal; Notable for the following:     WBC 4.4 (*)     RBC 2.14 (*)     Hemoglobin 7.6 (*)     Hematocrit POC Glucose 208 (*)     All other components within normal limits   RAPID INFLUENZA A/B ANTIGENS   CULTURE BLOOD #1   CULTURE BLOOD #2   URINE CULTURE, REFLEXED    Narrative:     Source: urine, clean catch       Site:           Current Antibiotics: not stated   APTT   PROTIME-INR   ANION GAP   MANUAL DIFFERENTIAL   LIPASE   ANION GAP   OSMOLALITY   BLOOD GAS, ARTERIAL   ANION GAP   COMPREHENSIVE METABOLIC PANEL W/ REFLEX TO MG FOR LOW K   CBC   HEMOGLOBIN AND HEMATOCRIT, BLOOD   BASIC METABOLIC PANEL W/ REFLEX TO MG FOR LOW K    BASIC METABOLIC PANEL W/ REFLEX TO MG FOR LOW K    MAGNESIUM   MAGNESIUM   PHOSPHORUS   PHOSPHORUS   CALCIUM, IONIZED   CALCIUM, IONIZED   BLOOD GAS, ARTERIAL   TROPONIN   TROPONIN   PROTIME-INR   HEMOGLOBIN AND HEMATOCRIT, BLOOD   PROTIME-INR   BLOOD OCCULT STOOL SCREEN #1   LACTIC ACID, PLASMA   BASIC METABOLIC PANEL W/ REFLEX TO MG FOR LOW K    MAGNESIUM   PHOSPHORUS   CALCIUM, IONIZED   BLOOD GAS, ARTERIAL   BASIC METABOLIC PANEL W/ REFLEX TO MG FOR LOW K    MAGNESIUM   PHOSPHORUS   CALCIUM, IONIZED   POCT GLUCOSE   POCT GLUCOSE   POCT GLUCOSE   POCT GLUCOSE   POCT GLUCOSE   PREPARE RBC (CROSSMATCH)    Narrative:     [id number]     p. Transfused  [id number]     selected   TYPE AND SCREEN       EMERGENCY DEPARTMENT COURSE:   Vitals:    Vitals:    02/12/18 2325 02/12/18 2330 02/13/18 0010 02/13/18 0017   BP: (!) 155/81 (!) 151/83  (!) 162/80   Pulse: 83 77 85 86   Resp: 22 22 22 22   Temp:  (!) 91.4 °F (33 °C)  (!) 91.4 °F (33 °C)   TempSrc:  Core  Core   SpO2: 96% 96% 96% 96%   Weight:       Height:           4:54 PM:     The patient arrived to the ER and was seen immediately on arrival at resuscitation bay. He had ROSC already. Accucheck was 111. Intubated already by EMS. He is put on ventilator. Lung sounds are audible both sides with crackles and rhonchi. He is put on cardia pads. He has an IO to right lower leg. Strong peripheral pulses. NS bolus 500 ml is given. GCS is 3 but he has corneal reflexes. Verified with EMS he is at full code. Left femoral therapeutic hypothermia catheter is inserted. EKG has no STEMI changes. BP drops to 59/41 and Levophed drip is started. His MAP was maintained at 65 and above with Levophed. CXR shows bilateral pneumonia. Zosyn and Vancomycin IV given. Labs are reviewed with multiple abnormalities. CTs head, C-spine, Chest, abdomen and pelvis are done. CT head show No ICH. CT chest again confirms Pneumonia involving the right and left lungs more severely in the left upper lobe. Bilateral small pleural effusions. CT abdomen shows possible pancreatitis. D/w family who wants give him a try and keep him at full code. Therapeutic hypothermia is started in ED after discussion was made with cardiologist and hospitalist covering ICU. Admitted to ICU by Hospitalist service. CRITICAL CARE:   CRITICAL CARE: There was a high probability of clinically significant/life threatening deterioration in this patient's condition which required my urgent intervention. Total critical care time was 120 minutes. This excludes any time for separately reportable procedures. CONSULTS:  Hospitalist    PROCEDURES:  Central Line Placement Procedure Note    Indication: vascular access, therapeutic hypothermia, centrally administered medications and need for frequent blood draws    Consent: Unable to be obtained due to the emergent nature of this procedure. Procedure: The patient was positioned appropriately and the skin over the left femoral vein was prepped with Chloraprep. Local anesthesia was not performed due to the emergent nature of this procedure. A large bore needle was used to identify the vein. A guide wire was then inserted into the vein through the needle.  A 9.3 Fr Zoll therapeutic hypothermia catheter with triple lumen catheter was then inserted into the vessel over the guide wire using the

## 2018-02-13 PROBLEM — R57.9 SHOCK (HCC): Status: ACTIVE | Noted: 2018-01-01

## 2018-02-13 PROBLEM — J96.02 ACUTE RESPIRATORY FAILURE WITH HYPERCAPNIA (HCC): Status: ACTIVE | Noted: 2018-01-01

## 2018-02-13 PROBLEM — J96.00 ACUTE RESPIRATORY FAILURE (HCC): Status: ACTIVE | Noted: 2018-01-01

## 2018-02-13 PROBLEM — E87.5 HYPERKALEMIA: Status: ACTIVE | Noted: 2018-01-01

## 2018-02-13 NOTE — PLAN OF CARE
Problem: Discharge Planning:  Intervention: Assess knowledge level of healthcare  Discussed code status with wife & son over phone, but both stated patient is \"DNR\"  Attempted to assess wife's knowledge of such, but it's late and wife overwhelmed and stated will continue Full Code overnight until family comes later this morning      Problem: Cardiac Output - Decreased:  Intervention: Assess cardiogenic shock signs and symptoms  It has taken 2 vasopressors to maintain blood pressure throughout the night.       Problem: Gas Exchange - Impaired:  Goal: Levels of oxygenation will improve  Levels of oxygenation will improve  Outcome: Ongoing  Vent to maintain airway    Problem: Serum Glucose Level - Abnormal:  Goal: Ability to maintain appropriate glucose levels will improve to within specified parameters  Ability to maintain appropriate glucose levels will improve to within specified parameters  Outcome: Ongoing  Glucostabilizer and Insulin gtt at first to control glucose, then only monitoring glucose    Problem: Skin Integrity - Impaired:  Goal: Will show no infection signs and symptoms  Will show no infection signs and symptoms  Outcome: Ongoing  Several wounds to arms, back & legs

## 2018-02-13 NOTE — ED NOTES
Discussed levophed drip with Dr. Derick Sanabria. States ok to increase rate and can keep increasing if need to.       Annmarie Villagomez RN  02/12/18 2010

## 2018-02-13 NOTE — CONSULTS
addition, there are small lateral pleural effusions left greater than right. OTHER: None. PNEUMOTHORAX: None. OSSEOUS STRUCTURES: 1. No acute osseous abnormality. 2. Generalized osteopenia. Impression:  1. The distal tip of the endotracheal tube is 3.5 cm above the level of the andreia. 2. The esophageal tube extends into the stomach and off the inferior aspect of the image. 3. There is a new catheter extending from the abdomen with the distal tip overlying the right atrium. 4. Worsening infiltrates throughout the left chest. There are additional interstitial infiltrates throughout the right chest and subsegmental atelectasis at the right lung base. In addition, there are small lateral pleural effusions left greater than right. 2/12/2018   FINDINGS: Cardiac contour is stable from prior study. Hyperinflated lung fields and chronic changes suggesting COPD. Bilateral pleural effusions and bilateral airspace opacities greatest in the left upper lobe concerning for edema or pneumonia. Endotracheal tube at 2.5 cm above the andreia. NG tube below the level of the diaphragm. Osteopenia and degenerative/chronic changes in the thoracic spine. Impression:  1. Bilateral pleural effusions and interval development of bilateral airspace opacities greatest in the left upper lobe correlate for edema or pneumonia or a combination. 2. Endotracheal tube is 2.5 cm above the andreia. Consider retracting approximately 2 cm. CT Scans  2/12/2018   CT CHEST:   The heart size is normal. The aorta is of normal caliber. There are dense calcifications along the walls of the aorta. There is no gross abnormality of the pulmonary artery. There is no mediastinal, axillary or hilar adenopathy. There are bilateral small  pleural effusions. There is a parenchymal infiltrate in both lung bases of the domes of diaphragm.  There is dense parenchymal infiltrate along the posterior aspect of the left upper lobe with more diffuse nodular alveolar and interstitial infiltrates in the more anterior aspect of the left upper lobe. There are diffuse nodular and alveolar infiltrates in the right upper lobe. The right middle lobe shows infiltrate versus atelectasis posteriorly. No pulmonary masses or nodules are noted. No suspicious osseous lesions are present. There are old healed rib fractures anteriorly of ribs 5 through 8 on the left. There are age-indeterminate compression deformities from a moderate to severe of T3-T10 as well as T12. CT abdomen pelvis: There is fatty stranding around the pancreas. The pancreas is of equal density throughout its extent. There is no cystic change around the pancreas. There is a small to moderate amount of water density fluid in the deep pelvis. There are gallstones within the otherwise normal-appearing gallbladder. The liver looks normal.   Spleen is unchanged showing a numerous dense granulomatous. There is an NG tube in the stomach. There are dense calcific dictation is of the abdominal aorta and iliac arteries unchanged. There is a moderate to severe uncomplicated diverticulosis of the sigmoid colon. The more proximal colon appears normal containing stool and gas. The small bowel appears normal.   There is a loop of small bowel along with fat and vessels into the patulous right inguinal canal. No evidence of bowel obstruction. Of the left internal canal demonstrates vessels and fat. Right and left kidneys appear normal and there noncontrast 8. The proximal ureters look normal. The urinary bladder is decompressed around a Blanton catheter balloon. There is mild osteopenia of the proximal femurs and pelvic bones. There is an age-indeterminate mild compression deformity of L2 superior endplate with no obvious acute appearing cortical abnormality. There is no evidence of paraspinous hematoma at this level. Impression:  1.   Pneumonia involving the right and left lungs more severely in the left upper lobe. 2. Bilateral small pleural effusions. 3. No other acute finding the chest.   4. Age-indeterminate thoracic vertebral body compression fractures. 5. Findings suggestive of acute pancreatitis. 6. Small to moderate amount of water density free fluid in the pelvis could be reactive secondary to pancreatitis. 7. There is a loop of small bowel within the patulous right inguinal canal with no evidence of bowel obstruction from this incarceration. CT CERVICAL SPINE WO CONTRAST  2/12/2018   1. No acute fractures or acute subluxations. 2. Degenerative changes as described level by level. 3. Old appearing healed bilateral posterior first rib fractures. CT HEAD WO CONTRAST  2/12/2018   FINDINGS: POSTOPERATIVE CHANGES: None. BRAIN VOLUME: 1. There is mild cerebral atrophy. VENTRICLES/CISTERNS: 1. The ventricles and cisterns are within normal limits for the patient's age and the degree of atrophy. INFARCT/WHITE MATTER DISEASE: 1. There is no acute large vessel infarct. 2. There is a small focus of subcortical hypoattenuation within the right frontal lobe (series 3 image 31). It is difficult to differentiate volume averaging related to a sulcus from a hypodense subcortical lesion. MRI of the brain is recommended for additional evaluation. 3. There is moderate white matter disease within the periventricular deep white matter and within the centrum semiovale. INTRACRANIAL HEMORRHAGE: None. MASS/MASS EFFECT/MIDLINE SHIFT: None. INTRA-AXIAL/EXTRA-AXIAL FLUID COLLECTIONS: None. INTRAORBITAL CONTENTS: 1. No acute intraorbital abnormality. OTHER: 1. Atheromatous calcification internal carotid arteries bilaterally. 2. Opacification of the right middle ear. SKULL/SCALP: 1. There is no acute process. 2. Generalized osteopenia. PARANASAL SINUSES: 1. Mild mucosal thickening within the maxillary sinuses bilaterally.  2. Moderate mucosal thickening throughout the ethmoid air cells Stable  Social/Spiritual/DNR/Miscellaneous   Bedrest.  Condition critical.  Vitals signs monitoring per protocol. Palliative Care consulted to assist with discussion regarding Code status. Patient family made the decision to withdraw care Dr. Sara Ramirez managing transition to comfort care. Pulmonary will sign off at this time       Thank you for the consult and allowing us to participate in the care of your patient. Case discussed with nurse and patient/family. Questions and concerns addressed. Meds and Orders reviewed. Electronically signed by     Anika Friedman CNP on 2/13/2018 at 11:06 AM    Seen in CCU case discussed with Enedina Murillo RN and Dr Miya Valle  S/P cardiac arrest with successful resuscitation, now vented, on hypothermia protocol  Ventilator settings adjusted through the night   Currently on 2 pressors, Nimbex, Midazolan, Fentanyl  Apparently Edwin Du had a DNR status and family is coming to discuss  plan of care  If further aggressive care is desired, will complete hypothermia protocol before evaluating for weaning    Patient seen and examined independently by me. Above discussed and I agree with Josefina Frankel CNP note Also see my additional comments. Labs, cultures, and radiographs when available were reviewed. Changes were made in the orders as necessary. I discussed patient concerns with Jasmine Bernheim R.N. and instructions were given. Respiratory care issues addressed. Please see our orders for the updated patient care plan.     Electronically signed by     Wade Frederick MD on 2/13/2018 at 12:24 PM

## 2018-02-13 NOTE — ED NOTES
Returned from [place]. Pt bedding changed. Pt incontinent of stool. Resp continued assist with vent. Resp at bedside.       Wilber Kent RN  02/12/18 1586

## 2018-02-13 NOTE — PROGRESS NOTES
In to see pt today regarding consult for back wounds as well as arms and legs having wounds. Pt noted to be on a [facility] bed at this time. Pt is intubated and sedated at this time. Primary nurse in room at this time and stated family member may be withdrawing care today. Primary nurse agreeable to assessment of wounds at this time. Pt was noted to have moderate edema throughout. Bruising noted to both arms and legs. Pt was assisted with 3 assist to turn to the right. Noted to have a deep tissue pressure injury to the coccyx/sacral area and a non-healing unstageable pressure injury to the mid back. There are areas of bruising and MARSI to the darren-wound skin. Unable to measure wounds at this time as pt was becoming unstable lying on side. Dry dressing apply to back without tape. Pt was repositioned to back. See photos below. S/O in room at this time. Denies needs at present. Will follow. Back      Coccyx/sacrum    Care plan reviewed with patient and RN. Patient and RN verbalize understanding of the plan of care and contribute to goal setting.

## 2018-02-13 NOTE — PLAN OF CARE
Problem: Impaired respiratory status  Goal: Will be able to breathe spontaneously, without ventilator support  Will be able to breathe spontaneously, without ventilator support    Outcome: Not Met This Shift  Pt is on the ventilator, current settings, AC, tv 450, rate 22, peep 10, 70%. Pt is not weaning at this time, is on hypothermic protocol and paralyzed.

## 2018-02-13 NOTE — SIGNIFICANT EVENT
[id number] [place] [id number]  Notice of Patient Passing      Patient Name- Ekaterina Ramírez Number- [de-identified]   Attending Physician- Jenelle Mason MD    Admitted on-2/12/2018  4:51 PM     On 2/13/2018 at 1210 patient was found in 3a12 with:   Absence of vital signs. Absence of neurological response. Confirmed time of death at 200. Physician or On-call Physician notified of time of death- yes    Family present at time of death- yes,    Spiritual care present at time of death- no, present prior to actual death    Physician was notified and orders were obtained to release the body. Post-Mortem documentation completed; form printed, signed, and given to admitting.     Veronica Mendenhall RN Nursing Supervisor/ Manager  2/13/18   1:07 PM

## 2018-02-13 NOTE — CONSULTS
remains low supported by pressor agent. I was updated by the staff. Past Medical History:   Diagnosis Date    AAA (abdominal aortic [facility]) ([facility] [address]     CAD (coronary artery disease)     Cancer (HCC)     skin    Chronic CHF (congestive heart failure) ([facility] [address] 12/23/2017    COPD (chronic obstructive pulmonary disease) (HCC)     uses 2 liters oxygen continuously    History of blood transfusion 2015    following surgery on scalp    Hx of blood clots 2006    after aorta surgery    --   in leg, right? ?  stent inserted    Hypertension     MI (myocardial infarction) 1980's    x2       Past Surgical History:   Procedure Laterality Date    AORTA SURGERY  2006??   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    bilateral cataract    HEMORRHOID SURGERY  ???    x2    HERNIA REPAIR  1980's???    x2 - right??    OTHER SURGICAL HISTORY  12/05/2016    Excision of squamous cell carcinoma of right lateral chest, and biopsy of right forearm     OTHER SURGICAL HISTORY Left 02/20/2017    Excision SCC zygomatic arch with skin graft    OTHER SURGICAL HISTORY Left 08/16/2017    Excision SCC of left ear helix with frozen section    OTHER SURGICAL HISTORY Right 12/11/2017    Excision squamous cell carcinoma right forearm with flap and frozen section    PRE-MALIGNANT / BENIGN SKIN LESION EXCISION  3/27/2015    BCC Scalp    SKIN BIOPSY      SKIN CANCER EXCISION Left 10/31/2014    squamous cell left ear x 2  with full thickness skin

## 2018-02-13 NOTE — CONSULTS
Patient seen and examined    doubt  Cardia  Origin for his acute event    patient wife seems  Wants him to be DNR    CONTINUE SUPPORTIVE RX    WILL GET ECHO    NO PLANS FOR ANY INVASIVE PROCEDURES    FULL CONSULT TO FOLLOW

## 2018-02-13 NOTE — H&P
tablet by mouth daily 1/5/18   Nino Pan MD   tamsulosin [facility]) 0.4 MG capsule Take 2 capsules by mouth daily 1/5/18   Nino Pan MD   vitamin D (CHOLECALCIFEROL) 1000 UNIT TABS tablet Take 5 tablets by mouth daily 1/5/18   Nino Pan MD   darbepoetin gal-polysorbate SEVEN [facility]) 100 MCG/0.5ML SOSY injection Inject 0.5 mLs into the skin once a week 1/10/18   Nino Pan MD   vitamin A 13298 units capsule Take 10,000 Units by mouth daily    Historical Provider, MD   Ascorbic Acid (VITAMIN C) 250 MG tablet Take 250 mg by mouth daily    Historical Provider, MD   zinc gluconate 50 MG tablet Take 50 mg by mouth daily    Historical Provider, MD   pantoprazole (PROTONIX) 40 MG tablet Take 1 tablet by mouth daily. 4/2/15   Tee Carrasco MD   predniSONE (DELTASONE) 5 MG tablet Take 5 mg by mouth daily. Historical Provider, MD   furosemide (LASIX) 40 MG tablet Take 40 mg by mouth daily. Historical Provider, MD   OXYGEN Inhale 2 L into the lungs continuous     Historical Provider, MD   budesonide-formoterol (SYMBICORT) 160-4.5 MCG/ACT AERO Inhale 2 puffs into the lungs 2 times daily. Historical Provider, MD   albuterol (PROVENTIL HFA;VENTOLIN HFA) 108 (90 BASE) MCG/ACT inhaler Inhale 2 puffs into the lungs every 6 hours as needed. Historical Provider, MD   Multiple Vitamins-Minerals (MULTIVITAMIN PO) Take 1 tablet by mouth daily. Last dose 3- for surgery 3-    Historical Provider, MD       Allergies:  Review of patient's allergies indicates no known allergies. Social History:      The patient currently lives at home    TOBACCO:   reports that he quit smoking about 35 years ago. He has a 52.50 pack-year smoking history. He quit smokeless tobacco use about 35 years ago. ETOH:   reports that he does not drink alcohol. Family History:      Reviewed in detail and negative for DM, CAD, Cancer, CVA. Positive as follows:    History reviewed.  No pertinent family history. Diet:  Diet NPO Effective Now    REVIEW OF SYSTEMS:   Pertinent positives as noted in the HPI. All other systems reviewed and negative. PHYSICAL EXAM:    BP (!) 152/82   Pulse 66   Temp (!) 91.4 °F (33 °C) (Core)   Resp 18   Ht 5' 8\" (1.727 m)   Wt 169 lb 5 oz (76.8 kg)   SpO2 99%   BMI 25.74 kg/m²     General appearance: In mod  distress, intubated;appears stated age and cooperative. HEENT:  Normal cephalic, atraumatic without obvious deformity. Pupils equal, round, and reactive to light. Extra ocular muscles intact. Conjunctivae/corneas clear. Neck: , with full range of motion. jugular venous distention. Trachea midline. Respiratory: Rales/Wheezes/Rhonchi. Cardiovascular:  Regular rate and rhythm with normal S1/S2 without murmurs, rubs or gallops. Abdomen: Soft, non-tender, non-distended with normal bowel sounds. Musculoskeletal:  No clubbing, has cyanosis ; edema bilaterally. 3+ . Skin: Cyanotic Skin color,. No rashes or lesions. Neurologic: intubated and sedated. Psychiatric:sedated and intuiabted  Capillary Refill: slow   Peripheral Pulses: +1 palpable, equal bilaterally       Labs:     Recent Labs      02/12/18   1727   WBC  4.4*   HGB  7.6*   HCT  23.7*   PLT  145     Recent Labs      02/12/18 2012 02/12/18   2330  02/12/18   2347  02/13/18   0541   NA  130*  132*  132*  132*   K  7.1*  5.9*  5.9*  5.9*   CL  87*  88*  88*  89*   CO2  32  33  33  31   BUN  105*  107*  107*  109*   CREATININE  2.6*  2.7*  2.7*  2.5*   CALCIUM  8.1*  8.3*  8.3*  8.2*   PHOS  6.7*   --   6.0*  5.1*     Recent Labs      02/12/18   1727   AST  97*   ALT  115*   BILITOT  0.2*   ALKPHOS  71     Recent Labs      02/12/18   1727  02/13/18   0541   INR  0.98  1.11     No results for input(s): CKTOTAL, TROPONINI in the last 72 hours.     Urinalysis:      Lab Results   Component Value Date    NITRU NEGATIVE 02/12/2018    WBCUA >200W/CLUMPS 02/12/2018    BACTERIA FEW 02/12/2018    RBCUA 5-10 02/12/2018

## 2018-02-13 NOTE — DISCHARGE SUMMARY
proximal colon appears normal containing stool and gas. The small bowel appears normal. There is a loop of small bowel along with fat and vessels into the patulous right inguinal canal. No evidence of bowel obstruction. Of the left internal canal demonstrates vessels and fat. Right and left kidneys appear normal and there noncontrast 8. The proximal ureters look normal. The urinary bladder is decompressed around a Blanton catheter balloon. There is mild osteopenia of the proximal femurs and pelvic bones. There is an age-indeterminate mild compression deformity of L2 superior endplate with no obvious acute appearing cortical abnormality. There is no evidence of paraspinous hematoma at this level. 1.  Pneumonia involving the right and left lungs more severely in the left upper lobe. 2.  Bilateral small pleural effusions. 3.  No other acute finding the chest. 4.  Age-indeterminate thoracic vertebral body compression fractures. 5.  Findings suggestive of acute pancreatitis. 6.  Small to moderate amount of water density free fluid in the pelvis could be reactive secondary to pancreatitis. 7.  There is a loop of small bowel within the patulous right inguinal canal with no evidence of bowel obstruction from this incarceration. **This report has been created using voice recognition software. It may contain minor errors which are inherent in voice recognition technology. ** Final report electronically signed by Dr. Conor Worley on 2/12/2018 10:16 PM    Ct Head Wo Contrast    Result Date: 2/12/2018  PROCEDURE: CT HEAD WO CONTRAST CLINICAL INFORMATION: Cardiac arrest. COMPARISON: No prior study. TECHNIQUE: 5 mm axial imaging through the head without IV contrast. All CT scans at this facility use dose modulation, iterative reconstruction, and/or weight based dosing when appropriate to reduce the radiation dose to as low as reasonably achievable. FINDINGS: POSTOPERATIVE CHANGES: None. BRAIN VOLUME: 1.  There is mild cerebral pelvis. There are gallstones within the otherwise normal-appearing gallbladder. The liver looks normal. Spleen is unchanged showing a numerous dense granulomatous. There is an NG tube in the stomach. There are dense calcific dictation is of the abdominal aorta and iliac arteries unchanged. There is a moderate to severe uncomplicated diverticulosis of the sigmoid colon. The more proximal colon appears normal containing stool and gas. The small bowel appears normal. There is a loop of small bowel along with fat and vessels into the patulous right inguinal canal. No evidence of bowel obstruction. Of the left internal canal demonstrates vessels and fat. Right and left kidneys appear normal and there noncontrast 8. The proximal ureters look normal. The urinary bladder is decompressed around a Blanton catheter balloon. There is mild osteopenia of the proximal femurs and pelvic bones. There is an age-indeterminate mild compression deformity of L2 superior endplate with no obvious acute appearing cortical abnormality. There is no evidence of paraspinous hematoma at this level. 1.  Pneumonia involving the right and left lungs more severely in the left upper lobe. 2.  Bilateral small pleural effusions. 3.  No other acute finding the chest. 4.  Age-indeterminate thoracic vertebral body compression fractures. 5.  Findings suggestive of acute pancreatitis. 6.  Small to moderate amount of water density free fluid in the pelvis could be reactive secondary to pancreatitis. 7.  There is a loop of small bowel within the patulous right inguinal canal with no evidence of bowel obstruction from this incarceration. **This report has been created using voice recognition software. It may contain minor errors which are inherent in voice recognition technology. ** Final report electronically signed by Dr. Demario Reese on 2/12/2018 10:16 PM    Ct Cervical Spine Wo Contrast    Result Date: 2/12/2018  PROCEDURE: CT CERVICAL SPINE WO CONTRAST 2/13/2018  PROCEDURE: XR CHEST PORTABLE CLINICAL INFORMATION: Ventilator. COMPARISON: 2/12/2018. TECHNIQUE: AP portable chest radiograph performed. FINDINGS: POSTSURGICAL CHANGES: None. LINES/TUBES/MECHANICAL DEVICES: 1. The distal tip of the endotracheal tube is 3.5 cm above the level of the andreia. 2. The esophageal tube extends into the stomach and off the inferior aspect of the image. 3. There is a new catheter extending from the abdomen with the distal tip overlying the right atrium. 4. Partially imaged caval filter medial right upper abdomen. TRACHEA/HEART/MEDIASTINUM/HILUM: 1. Stable mild enlargement of the cardiac silhouette which is partially obscured. LUNG JENKINS: 1. Worsening infiltrates throughout the left chest. There are additional interstitial infiltrates throughout the right chest and subsegmental atelectasis at the right lung base. In addition, there are small lateral pleural effusions left greater than right. OTHER: None. PNEUMOTHORAX:  None. OSSEOUS STRUCTURES: 1. No acute osseous abnormality. 2. Generalized osteopenia. 1. The distal tip of the endotracheal tube is 3.5 cm above the level of the andreia. 2. The esophageal tube extends into the stomach and off the inferior aspect of the image. 3. There is a new catheter extending from the abdomen with the distal tip overlying the right atrium. 4. Worsening infiltrates throughout the left chest. There are additional interstitial infiltrates throughout the right chest and subsegmental atelectasis at the right lung base. In addition, there are small lateral pleural effusions left greater than right. **This report has been created using voice recognition software. It may contain minor errors which are inherent in voice recognition technology. ** Final report electronically signed by Dr. Mini Nieves on 2/13/2018 7:01 AM    Xr Chest Portable    Result Date: 2/12/2018  PROCEDURE: XR CHEST PORTABLE CLINICAL INFORMATION: code, .  COMPARISON: December 19,

## 2018-02-14 LAB — VRE CULTURE: NORMAL

## 2018-02-14 NOTE — CONSULTS
me.    REVIEW OF SYSTEMS:  The patient has multiple medical problems, has history  of chronic anemia and GI bleed. In the past, apparently he had bed sores  and he had nausea. The patient has a renal failure with severe dehydration  and malnutrition. The patient has generalized weakness. His moving  average has been limited. The patient has hyperkalemia, and he had also  revision of his liver enzymes. The patient has arthritis, generalized  weakness. It is not very clear whether the patient has prior strokes or not. SOCIAL HISTORY:  He is a former smoker. ALLERGIES:  No known allergies. CODE STATUS:  Apparently, the patient and his family wants him to be no  code in the past.    PHYSICAL EXAMINATION:  VITAL SIGNS:  Blood pressure 100/60. He was in sinus rhythm. He was  intubated. HEART:  The patient has 3/6 systolic murmur. CHEST:  He has poor exchange and bilateral rhonchi. ABDOMEN:  Soft. BACK:  He has bed sores in his back. EXTREMITIES:  Lower extremities, edema and possible peripheral vascular  disease. LABORATORY DATA:  This patient's EKG showed sinus rhythm and poor R wave  progression V1 to V3 and diffuse ST segment depression. IMPRESSION:  1. The patient is in cardiopulmonary arrest.  He is intubated with  possible brain and hypoxic encephalopathy, on hyperthermia protocol. The  exact etiology for his acute event is not very clear. 2.  Possible coronary artery disease and abnormal EKG. 3.  Chronic renal failure. 4.  Severe anemia. 5.  Decubitus ulcers, positive for MRSA. 6.  Hyperkalemia, will be responsible for _____. 7.  History of congestive heart failure. 8.  Elevated liver enzymes. 9.  Acute renal failure on the top of chronic. PLAN:  From the cardiac standpoint, I do not believe that the patient is a  candidate for any invasive procedure at this point. I believe the wishes  of the patient before as well as family of no code status should be  addressed.   We

## 2018-02-15 LAB
BLOOD CULTURE, ROUTINE: ABNORMAL
BLOOD CULTURE, ROUTINE: ABNORMAL
ORGANISM: ABNORMAL

## 2018-02-17 LAB — MRSA SCREEN: NORMAL

## 2018-02-18 LAB — BLOOD CULTURE, ROUTINE: NORMAL

## (undated) DEVICE — PACK PROCEDURE SURG PLAS SC MIN SRHP LF

## (undated) DEVICE — GLOVE SURG SZ 65 THK91MIL LTX FREE SYN POLYISOPRENE

## (undated) DEVICE — GLOVE ORANGE PI 7   MSG9070

## (undated) DEVICE — DUP USE 304928 DRESSING GZ 12 PLY 4X4 STRL

## (undated) DEVICE — BANDAGE COMPR E SGL LAYERED CLSR BGE W/ CLP W4INXL15FT

## (undated) DEVICE — FLEXIBLE ADHESIVE BANDAGE: Brand: CURITY

## (undated) DEVICE — GLOVE SURG SZ 8 L11.77IN FNGR THK9.8MIL STRW LTX POLYMER

## (undated) DEVICE — BANDAGE,GAUZE,4.5"X4.1YD,STERILE,LF: Brand: MEDLINE

## (undated) DEVICE — CHLORAPREP 26ML CLEAR

## (undated) DEVICE — GOWN,SIRUS,NON REINFRCD,LARGE,SET IN SL: Brand: MEDLINE

## (undated) DEVICE — DRESSING,GAUZE,XEROFORM,CURAD,5"X9",ST: Brand: CURAD

## (undated) DEVICE — BANDAGE COMPR M W4INXL10YD WHT BGE VELC E MTRX HK AND LOOP

## (undated) DEVICE — SPONGE GZ W4XL4IN COT 12 PLY TYP VII WVN C FLD DSGN

## (undated) DEVICE — GAUZE,SPONGE,4"X4",12PLY,STERILE,LF,2'S: Brand: MEDLINE